# Patient Record
Sex: FEMALE | Race: WHITE | NOT HISPANIC OR LATINO | Employment: UNEMPLOYED | URBAN - METROPOLITAN AREA
[De-identification: names, ages, dates, MRNs, and addresses within clinical notes are randomized per-mention and may not be internally consistent; named-entity substitution may affect disease eponyms.]

---

## 2017-07-16 ENCOUNTER — HOSPITAL ENCOUNTER (EMERGENCY)
Facility: HOSPITAL | Age: 52
Discharge: HOME | End: 2017-07-16
Attending: EMERGENCY MEDICINE
Payer: COMMERCIAL

## 2017-07-16 VITALS
WEIGHT: 192.9 LBS | TEMPERATURE: 97.9 F | HEART RATE: 93 BPM | SYSTOLIC BLOOD PRESSURE: 146 MMHG | OXYGEN SATURATION: 98 % | DIASTOLIC BLOOD PRESSURE: 93 MMHG | RESPIRATION RATE: 16 BRPM

## 2017-07-16 DIAGNOSIS — Z23 NEED FOR RABIES VACCINATION: Primary | ICD-10-CM

## 2017-07-16 PROCEDURE — 90471 IMMUNIZATION ADMIN: CPT

## 2017-07-16 PROCEDURE — 96372 THER/PROPH/DIAG INJ SC/IM: CPT

## 2017-07-16 PROCEDURE — 99283 EMERGENCY DEPT VISIT LOW MDM: CPT

## 2017-07-16 PROCEDURE — 90376 RABIES IG HEAT TREATED: CPT | Performed by: EMERGENCY MEDICINE

## 2017-07-16 PROCEDURE — 90675 RABIES VACCINE IM: CPT | Performed by: EMERGENCY MEDICINE

## 2017-07-16 RX ORDER — METHOTREXATE 2.5 MG/1
TABLET ORAL WEEKLY
COMMUNITY
End: 2019-05-21 | Stop reason: ALTCHOICE

## 2017-07-16 RX ADMIN — HUMAN RABIES VIRUS IMMUNE GLOBULIN 1755 UNITS: 150 INJECTION, SOLUTION INTRAMUSCULAR at 11:44

## 2017-07-16 RX ADMIN — RABIES VACCINE 1 ML: KIT at 11:45

## 2017-07-19 ENCOUNTER — OFFICE VISIT (OUTPATIENT)
Dept: URGENT CARE | Facility: CLINIC | Age: 52
End: 2017-07-19
Payer: COMMERCIAL

## 2017-07-19 PROCEDURE — 99283 EMERGENCY DEPT VISIT LOW MDM: CPT

## 2017-07-19 PROCEDURE — 90675 RABIES VACCINE IM: CPT

## 2017-07-19 PROCEDURE — S9083 URGENT CARE CENTER GLOBAL: HCPCS

## 2017-07-19 PROCEDURE — G0382 LEV 3 HOSP TYPE B ED VISIT: HCPCS

## 2017-07-22 ENCOUNTER — OFFICE VISIT (OUTPATIENT)
Dept: URGENT CARE | Facility: CLINIC | Age: 52
End: 2017-07-22
Payer: COMMERCIAL

## 2017-07-22 PROCEDURE — 99282 EMERGENCY DEPT VISIT SF MDM: CPT

## 2017-07-22 PROCEDURE — G0381 LEV 2 HOSP TYPE B ED VISIT: HCPCS

## 2017-07-22 PROCEDURE — S9083 URGENT CARE CENTER GLOBAL: HCPCS

## 2017-07-28 ENCOUNTER — OFFICE VISIT (OUTPATIENT)
Dept: URGENT CARE | Facility: CLINIC | Age: 52
End: 2017-07-28
Payer: COMMERCIAL

## 2017-07-28 PROCEDURE — S9083 URGENT CARE CENTER GLOBAL: HCPCS

## 2017-07-28 PROCEDURE — 99283 EMERGENCY DEPT VISIT LOW MDM: CPT

## 2017-07-28 PROCEDURE — 90675 RABIES VACCINE IM: CPT

## 2017-07-28 PROCEDURE — G0382 LEV 3 HOSP TYPE B ED VISIT: HCPCS

## 2019-05-18 RX ORDER — ALBUTEROL SULFATE 90 UG/1
AEROSOL, METERED RESPIRATORY (INHALATION)
COMMUNITY
End: 2020-02-18 | Stop reason: SDUPTHER

## 2019-05-21 ENCOUNTER — OFFICE VISIT (OUTPATIENT)
Dept: FAMILY MEDICINE CLINIC | Facility: CLINIC | Age: 54
End: 2019-05-21
Payer: COMMERCIAL

## 2019-05-21 VITALS
TEMPERATURE: 98.1 F | WEIGHT: 193 LBS | SYSTOLIC BLOOD PRESSURE: 120 MMHG | HEIGHT: 66 IN | HEART RATE: 87 BPM | DIASTOLIC BLOOD PRESSURE: 72 MMHG | OXYGEN SATURATION: 98 % | BODY MASS INDEX: 31.02 KG/M2

## 2019-05-21 DIAGNOSIS — E55.9 VITAMIN D DEFICIENCY: ICD-10-CM

## 2019-05-21 DIAGNOSIS — M75.52 BURSITIS OF LEFT SHOULDER: ICD-10-CM

## 2019-05-21 DIAGNOSIS — M06.9 RHEUMATOID ARTHRITIS, INVOLVING UNSPECIFIED SITE, UNSPECIFIED RHEUMATOID FACTOR PRESENCE: Primary | ICD-10-CM

## 2019-05-21 DIAGNOSIS — R53.83 FATIGUE, UNSPECIFIED TYPE: ICD-10-CM

## 2019-05-21 PROCEDURE — 99203 OFFICE O/P NEW LOW 30 MIN: CPT | Performed by: FAMILY MEDICINE

## 2019-05-23 ENCOUNTER — TELEPHONE (OUTPATIENT)
Dept: FAMILY MEDICINE CLINIC | Facility: CLINIC | Age: 54
End: 2019-05-23

## 2019-05-23 LAB
25(OH)D3+25(OH)D2 SERPL-MCNC: 36.7 NG/ML (ref 30–100)
ALBUMIN SERPL-MCNC: 4.5 G/DL (ref 3.5–5.5)
ALBUMIN/GLOB SERPL: 1.7 {RATIO} (ref 1.2–2.2)
ALP SERPL-CCNC: 94 IU/L (ref 39–117)
ALT SERPL-CCNC: 15 IU/L (ref 0–32)
AST SERPL-CCNC: 15 IU/L (ref 0–40)
BILIRUB SERPL-MCNC: 0.2 MG/DL (ref 0–1.2)
BUN SERPL-MCNC: 18 MG/DL (ref 6–24)
BUN/CREAT SERPL: 24 (ref 9–23)
CALCIUM SERPL-MCNC: 9.8 MG/DL (ref 8.7–10.2)
CHLORIDE SERPL-SCNC: 106 MMOL/L (ref 96–106)
CHOLEST SERPL-MCNC: 212 MG/DL (ref 100–199)
CO2 SERPL-SCNC: 22 MMOL/L (ref 20–29)
CREAT SERPL-MCNC: 0.74 MG/DL (ref 0.57–1)
ERYTHROCYTE [SEDIMENTATION RATE] IN BLOOD BY WESTERGREN METHOD: 9 MM/HR (ref 0–40)
GLOBULIN SER-MCNC: 2.7 G/DL (ref 1.5–4.5)
GLUCOSE SERPL-MCNC: 107 MG/DL (ref 65–99)
HDLC SERPL-MCNC: 55 MG/DL
LDLC SERPL CALC-MCNC: 142 MG/DL (ref 0–99)
LDLC/HDLC SERPL: 2.6 RATIO (ref 0–3.2)
POTASSIUM SERPL-SCNC: 4.1 MMOL/L (ref 3.5–5.2)
PROT SERPL-MCNC: 7.2 G/DL (ref 6–8.5)
SL AMB EGFR AFRICAN AMERICAN: 106 ML/MIN/1.73
SL AMB EGFR NON AFRICAN AMERICAN: 92 ML/MIN/1.73
SL AMB VLDL CHOLESTEROL CALC: 15 MG/DL (ref 5–40)
SODIUM SERPL-SCNC: 144 MMOL/L (ref 134–144)
TRIGL SERPL-MCNC: 76 MG/DL (ref 0–149)
TSH SERPL DL<=0.005 MIU/L-ACNC: 0.6 UIU/ML (ref 0.45–4.5)

## 2019-06-24 ENCOUNTER — OFFICE VISIT (OUTPATIENT)
Dept: FAMILY MEDICINE CLINIC | Facility: CLINIC | Age: 54
End: 2019-06-24
Payer: COMMERCIAL

## 2019-06-24 VITALS
DIASTOLIC BLOOD PRESSURE: 72 MMHG | SYSTOLIC BLOOD PRESSURE: 124 MMHG | OXYGEN SATURATION: 98 % | TEMPERATURE: 98.4 F | HEIGHT: 66 IN | BODY MASS INDEX: 31.18 KG/M2 | WEIGHT: 194 LBS | HEART RATE: 82 BPM

## 2019-06-24 DIAGNOSIS — M75.52 BURSITIS OF LEFT SHOULDER: ICD-10-CM

## 2019-06-24 DIAGNOSIS — Z12.31 ENCOUNTER FOR SCREENING MAMMOGRAM FOR BREAST CANCER: Primary | ICD-10-CM

## 2019-06-24 DIAGNOSIS — Z00.00 WELLNESS EXAMINATION: ICD-10-CM

## 2019-06-24 DIAGNOSIS — M06.9 RHEUMATOID ARTHRITIS, INVOLVING UNSPECIFIED SITE, UNSPECIFIED RHEUMATOID FACTOR PRESENCE: ICD-10-CM

## 2019-06-24 PROCEDURE — 99396 PREV VISIT EST AGE 40-64: CPT | Performed by: FAMILY MEDICINE

## 2019-07-24 ENCOUNTER — EVALUATION (OUTPATIENT)
Dept: PHYSICAL THERAPY | Facility: CLINIC | Age: 54
End: 2019-07-24
Payer: COMMERCIAL

## 2019-07-24 DIAGNOSIS — M77.8 SHOULDER TENDONITIS, LEFT: Primary | ICD-10-CM

## 2019-07-24 DIAGNOSIS — M25.512 ACUTE PAIN OF LEFT SHOULDER: ICD-10-CM

## 2019-07-24 PROCEDURE — 97010 HOT OR COLD PACKS THERAPY: CPT | Performed by: PHYSICAL THERAPIST

## 2019-07-24 PROCEDURE — 97162 PT EVAL MOD COMPLEX 30 MIN: CPT | Performed by: PHYSICAL THERAPIST

## 2019-07-26 ENCOUNTER — APPOINTMENT (OUTPATIENT)
Dept: PHYSICAL THERAPY | Facility: CLINIC | Age: 54
End: 2019-07-26
Payer: COMMERCIAL

## 2019-07-30 ENCOUNTER — OFFICE VISIT (OUTPATIENT)
Dept: PHYSICAL THERAPY | Facility: CLINIC | Age: 54
End: 2019-07-30
Payer: COMMERCIAL

## 2019-07-30 DIAGNOSIS — M25.512 ACUTE PAIN OF LEFT SHOULDER: ICD-10-CM

## 2019-07-30 DIAGNOSIS — M77.8 SHOULDER TENDONITIS, LEFT: Primary | ICD-10-CM

## 2019-07-30 PROCEDURE — 97140 MANUAL THERAPY 1/> REGIONS: CPT | Performed by: PHYSICAL THERAPIST

## 2019-07-30 PROCEDURE — 97110 THERAPEUTIC EXERCISES: CPT | Performed by: PHYSICAL THERAPIST

## 2019-07-30 PROCEDURE — 97010 HOT OR COLD PACKS THERAPY: CPT | Performed by: PHYSICAL THERAPIST

## 2019-07-30 NOTE — PROGRESS NOTES
Daily Note     Today's date: 2019  Patient name: Aleah Quezada  : 1965  MRN: [id number]  Referring provider: Cindy Bansal MD  Dx:   Encounter Diagnosis     ICD-10-CM    1  Shoulder tendonitis, left M75 82    2  Acute pain of left shoulder M25 512                 Pt treated by MH, SPT under direct supervision of RS, YENY    Subjective: Pt reports she was sore after IE, but completing HEP exercises alleviated some pain and stiffness in her shoulder  She reports her neck is not as painful and that her HEP for her shoulder may be helping that as well  Objective: See treatment diary below      Assessment: Tolerated treatment fair  Pt performed exercises w/ good technique within tolerable range  Required few verbal/tactile cues to relax UT and decrease compensation  Pt completed exercises cautiously in attempt to decrease onset of pain  Pt continues to present with high reactivity to motion, but seemed to increase tolerance to manual PROM when distraction was applied through PROM  Patient would benefit from continued PT  Plan: Continue per plan of care  Focus on ROM and pain control  Precautions: Asthma (has inhaler), RA   EPOC: 2019     Daily Treatment Diary      Manual           L shoulder PROM NV MH,SPT         L GHJ mobs NV MH,SPT (distraction)         L Scapular PROM NV Np         L 1 rib mob  RS MH,SPT         TPR L UT/Lat delt/biceps NV MH,SPT                    Progress Note           Total time 3' 12'               Exercise Diary       HEP instruct and review  5'      Arm Delvin's: flexion/scap  (may not be able to tolerate initially)       Pendulums m/l and a/p   20x     Posterior shoulder rolls  20x     scap squeeze  30x     Table slides flex/scap  10"x10 ea     Supine AAROM flex/ER  5"x10 (flex only, ER inc pain*)     Standing Cane IR/ext  attempt nv     Std flex/scap              S/l IR/ER w/ towel roll       S/l abd AROM        scap punches Flexion centering       Prone mod row       Prone mod ext       Prone mod abd                                                              Modalities  7/24 7/30      CP 10' 10'       consider pulsed ultrasound

## 2019-08-01 ENCOUNTER — APPOINTMENT (OUTPATIENT)
Dept: PHYSICAL THERAPY | Facility: CLINIC | Age: 54
End: 2019-08-01
Payer: COMMERCIAL

## 2019-08-05 ENCOUNTER — APPOINTMENT (OUTPATIENT)
Dept: PHYSICAL THERAPY | Facility: CLINIC | Age: 54
End: 2019-08-05
Payer: COMMERCIAL

## 2019-08-07 ENCOUNTER — OFFICE VISIT (OUTPATIENT)
Dept: PHYSICAL THERAPY | Facility: CLINIC | Age: 54
End: 2019-08-07
Payer: COMMERCIAL

## 2019-08-07 DIAGNOSIS — M77.8 SHOULDER TENDONITIS, LEFT: Primary | ICD-10-CM

## 2019-08-07 DIAGNOSIS — M25.512 ACUTE PAIN OF LEFT SHOULDER: ICD-10-CM

## 2019-08-07 PROCEDURE — 97110 THERAPEUTIC EXERCISES: CPT

## 2019-08-07 NOTE — PROGRESS NOTES
Daily Note     Today's date: 2019  Patient name: Linda Gonzalez  : 1965  MRN: [id number]  Referring provider: Roderick Manrique MD  Dx:   Encounter Diagnosis     ICD-10-CM    1  Shoulder tendonitis, left M75 82    2  Acute pain of left shoulder M25 512                 Pt treated by MH, SPT under direct supervision of JK, PTA    Subjective: Pt reports sleeping has gotten easier since IE  Pt reports compliance w HEP and decrease in pain/symptoms  She states her shoulder feels more loose but she cont's to have limited ROM  Objective: See treatment diary below       Assessment: Tolerated treatment fair  Pt had inc tolerance for exercises w/ providing manuals first and pt demonstrated increased ROM/decreased pain  Pt cont's to compensate w/ UT to achieve shoulder ROM and requires tactile cues to decrease compensation  Patient would benefit from continued PT  Plan: Continue per plan of care  Focus on ROM and pain control  Precautions: Asthma (has inhaler), RA   EPOC: 2019     Daily Treatment Diary      Manual   8/7        L shoulder PROM NV MH,SPT MH,SPT        L GHJ mobs NV MH,SPT (distraction) MH,SPT (distraction/inferior)        L Scapular PROM NV Np np        L 1 rib mob  RS MH,SPT MH,SPT        TPR L UT/Lat delt/biceps NV MH,SPT MH,SPT                   Progress Note           Total time 3' 12' 12'              Exercise Diary   8/7    HEP instruct and review  5'      Arm Delvin's: flexion/scap  (may not be able to tolerate initially)       Pendulums m/l and a/p   20x 30x     Posterior shoulder rolls  20x 20x    scap squeeze  30x 30x     Table slides flex/scap  10"x10 ea 10" x 10 ea    Supine AAROM flex/ER  5"x10 (flex only, ER inc pain*) 5" x10  (flex)     Standing Cane IR/ext  attempt nv scaption 15x     Std flex/scap              S/l IR/ER w/ towel roll       S/l abd AROM        scap punches   15x    Flexion centering       Prone mod row       Prone mod ext Prone mod abd                                                              Modalities  7/24 7/30 8/7     CP 10' 10'  Home      consider pulsed ultrasound          Pt's treatment time over lapped another pt  Limited billing today due to pt was not one on one

## 2019-09-30 ENCOUNTER — TELEPHONE (OUTPATIENT)
Dept: FAMILY MEDICINE CLINIC | Facility: CLINIC | Age: 54
End: 2019-09-30

## 2019-10-11 ENCOUNTER — TELEPHONE (OUTPATIENT)
Dept: OTHER | Facility: OTHER | Age: 54
End: 2019-10-11

## 2019-10-11 ENCOUNTER — OFFICE VISIT (OUTPATIENT)
Dept: FAMILY MEDICINE CLINIC | Facility: CLINIC | Age: 54
End: 2019-10-11
Payer: COMMERCIAL

## 2019-10-11 VITALS
WEIGHT: 192 LBS | DIASTOLIC BLOOD PRESSURE: 74 MMHG | OXYGEN SATURATION: 98 % | BODY MASS INDEX: 32.78 KG/M2 | HEIGHT: 64 IN | SYSTOLIC BLOOD PRESSURE: 122 MMHG | HEART RATE: 87 BPM | TEMPERATURE: 97.2 F

## 2019-10-11 DIAGNOSIS — B36.9 FUNGAL RASH OF TORSO: ICD-10-CM

## 2019-10-11 DIAGNOSIS — J01.00 ACUTE MAXILLARY SINUSITIS, RECURRENCE NOT SPECIFIED: Primary | ICD-10-CM

## 2019-10-11 DIAGNOSIS — Z23 NEED FOR VACCINATION: ICD-10-CM

## 2019-10-11 PROCEDURE — 90471 IMMUNIZATION ADMIN: CPT

## 2019-10-11 PROCEDURE — 3008F BODY MASS INDEX DOCD: CPT | Performed by: NURSE PRACTITIONER

## 2019-10-11 PROCEDURE — 99213 OFFICE O/P EST LOW 20 MIN: CPT | Performed by: NURSE PRACTITIONER

## 2019-10-11 PROCEDURE — 90682 RIV4 VACC RECOMBINANT DNA IM: CPT

## 2019-10-11 RX ORDER — KETOCONAZOLE 20 MG/G
CREAM TOPICAL DAILY
Qty: 15 G | Refills: 0 | Status: SHIPPED | OUTPATIENT
Start: 2019-10-11

## 2019-10-11 RX ORDER — AMOXICILLIN AND CLAVULANATE POTASSIUM 875; 125 MG/1; MG/1
1 TABLET, FILM COATED ORAL EVERY 12 HOURS SCHEDULED
Qty: 20 TABLET | Refills: 0 | Status: SHIPPED | OUTPATIENT
Start: 2019-10-11 | End: 2019-10-12 | Stop reason: ALTCHOICE

## 2019-10-11 NOTE — PROGRESS NOTES
[facility]        NAME: Janey Wagner is a 53 y o  female  : 1965    MRN: [id number]  DATE: 2019  TIME: 9:00 AM    Assessment and Plan   Acute maxillary sinusitis, recurrence not specified [J01 00]  1  Acute maxillary sinusitis, recurrence not specified  amoxicillin-clavulanate (AUGMENTIN) 875-125 mg per tablet   2  Need for vaccination  influenza vaccine, [id number], quadrivalent, recombinant, PF, 0 5 mL, for patients 18 yr+ (FLUBLOK)   3  Fungal rash of torso  ketoconazole (NIZORAL) 2 % cream         Patient Instructions     Patient Instructions   Discussed viral vs bacterial infection  RX as ordered  Continue OTC cough/cold medications as directed  Flu vaccine given    Apply cream to rash under breast as directed  Keep area clean and dry  Call or return for problems/concerns            Chief Complaint     Chief Complaint   Patient presents with    Sinus Pressure     x1 week, using OTC meds    Rash     under breast         History of Present Illness       Sinus pain and pressure, congestion x 1 week  tried sudafed with no relief  Has allergies does not take any allergy medication daily  Review of Systems   Review of Systems   Constitutional: Positive for chills  Negative for activity change, fatigue and fever  HENT: Positive for congestion, ear pain, postnasal drip, rhinorrhea, sinus pressure, sinus pain and sore throat  Eyes: Negative for pain, discharge and redness  Respiratory: Positive for cough and shortness of breath  Negative for wheezing  Cardiovascular: Negative for chest pain  Gastrointestinal: Negative for constipation, diarrhea, nausea and vomiting  Musculoskeletal: Negative for myalgias  Skin: Negative for rash  Neurological: Positive for headaches  Negative for dizziness           Current Medications       Current Outpatient Medications:     albuterol (PROVENTIL HFA) 90 mcg/act inhaler, Inhale, Disp: , Rfl:    amoxicillin-clavulanate (AUGMENTIN) 875-125 mg per tablet, Take 1 tablet by mouth every 12 (twelve) hours for 10 days, Disp: 20 tablet, Rfl: 0    ketoconazole (NIZORAL) 2 % cream, Apply topically daily, Disp: 15 g, Rfl: 0    Current Allergies     Allergies as of 10/11/2019 - Reviewed 10/11/2019   Allergen Reaction Noted    Cinchonine  2017    Hydroxychloroquine  2016    Moxifloxacin  2017    Other  04/10/2015    Sulfa antibiotics  2017    Zithromax [azithromycin]  2017            The following portions of the patient's history were reviewed and updated as appropriate: allergies, current medications, past family history, past medical history, past social history, past surgical history and problem list      Past Medical History:   Diagnosis Date    Arthritis     Asthma     Rheumatoid arthritis ([address] )        Past Surgical History:   Procedure Laterality Date     SECTION         Family History   Adopted: Yes   Problem Relation Age of Onset    Alzheimer's disease Mother          Medications have been verified  Objective   /74   Pulse 87   Temp (!) 97 2 °F (36 2 °C)   Ht 5' 4" (1 626 m)   Wt 87 1 kg (192 lb)   SpO2 98%   BMI 32 96 kg/m²        Physical Exam     Physical Exam   Constitutional: She is oriented to person, place, and time  She appears well-developed and well-nourished  No distress  HENT:   Head: Normocephalic and atraumatic  Right Ear: Tympanic membrane, external ear and ear canal normal    Left Ear: Tympanic membrane, external ear and ear canal normal    Nose: Rhinorrhea present  No epistaxis  Right sinus exhibits maxillary sinus tenderness  Left sinus exhibits maxillary sinus tenderness  Mouth/Throat: Uvula is midline, oropharynx is clear and moist and mucous membranes are normal    Cardiovascular: Normal rate, regular rhythm and normal heart sounds  Exam reveals no gallop and no friction rub  No murmur heard    Pulmonary/Chest: Effort normal and breath sounds normal  No respiratory distress  She has no wheezes  She has no rales  Abdominal: She exhibits no distension  Musculoskeletal: Normal range of motion  Neurological: She is alert and oriented to person, place, and time  Skin: Skin is warm  Rash (under right breast) noted  She is not diaphoretic  Psychiatric: She has a normal mood and affect  Her behavior is normal  Judgment and thought content normal    Nursing note and vitals reviewed  BMI Counseling: Body mass index is 32 96 kg/m²  The BMI is above normal  Nutrition recommendations include reducing portion sizes, consuming healthier snacks, moderation in carbohydrate intake and increasing intake of lean protein  Exercise recommendations include exercising 3-5 times per week and strength training exercises

## 2019-10-11 NOTE — PATIENT INSTRUCTIONS
Discussed viral vs bacterial infection  RX as ordered  Continue OTC cough/cold medications as directed  Flu vaccine given    Apply cream to rash under breast as directed  Keep area clean and dry  Call or return for problems/concerns

## 2019-10-12 RX ORDER — AMOXICILLIN 500 MG/1
500 CAPSULE ORAL EVERY 8 HOURS SCHEDULED
Qty: 30 CAPSULE | Refills: 0 | Status: SHIPPED | OUTPATIENT
Start: 2019-10-12 | End: 2019-10-22

## 2019-10-12 NOTE — TELEPHONE ENCOUNTER
David Gilbert 1965  CONFIDENTIALTY NOTICE: This fax transmission is intended only for the addressee  It contains information that is legally privileged,  confidential or otherwise protected from use or disclosure  If you are not the intended recipient, you are strictly prohibited from reviewing,  disclosing, copying using or disseminating any of this information or taking any action in reliance on or regarding this information  If you have  received this fax in error, please notify us immediately by telephone so that we can arrange for its return to us  Page:   Call Id: [id number]  Health Call  Standard Call Report  Health Call  Patient Name: David Gilbert  Gender: Female  : 1965  Age: 53 Y 3 M 6 D  Return Phone  Number: [phone] (Current)  Address: [address] [place] [address]/[facility]: [facility] [address]  Practice Name: [address] [place] [address]  Practice Charged:  Physician:  [facility] Name:  Relationship To  Patient: Self  Return Phone Number: [phone] (Current)  Presenting Problem: " I was seen today at the office and the  wrong antibiotic was called in "  Service Type: Prescription Refills  Charged Service 1: N/A  Pharmacy Name and  Number:  Nurse Assessment  Protocols  Protocol Title Nurse Date/Time  Disp  Time Disposition Final User  10/11/2019 8:13:15 PM Send to Follow Up Briana Sims RN, [facility]  10/11/2019 8:39:49 PM Send to Follow Up Briana Sims RN, [facility]  10/11/2019 8:57:48 PM Close Yes Nicole Carballo RN, [facility]  Comments  User: Rosita Lombardo RN Date/Time: 10/11/2019 8:57:01 PM  Pt called because she is expecting Amoxicillin st the pharmacy, but Amoxicillin -clavulanate was prescribed  I spoke with  Katie Rocha ([address]) and she will call in the Amoxocillin tomorrow morning  Pt made aware and verbalized understanding

## 2020-02-18 ENCOUNTER — OFFICE VISIT (OUTPATIENT)
Dept: FAMILY MEDICINE CLINIC | Facility: CLINIC | Age: 55
End: 2020-02-18
Payer: COMMERCIAL

## 2020-02-18 VITALS
SYSTOLIC BLOOD PRESSURE: 120 MMHG | TEMPERATURE: 101.7 F | OXYGEN SATURATION: 96 % | RESPIRATION RATE: 20 BRPM | DIASTOLIC BLOOD PRESSURE: 80 MMHG | BODY MASS INDEX: 32.03 KG/M2 | WEIGHT: 187.6 LBS | HEIGHT: 64 IN | HEART RATE: 115 BPM

## 2020-02-18 DIAGNOSIS — J11.1 INFLUENZA: Primary | ICD-10-CM

## 2020-02-18 PROCEDURE — 3008F BODY MASS INDEX DOCD: CPT | Performed by: FAMILY MEDICINE

## 2020-02-18 PROCEDURE — 1036F TOBACCO NON-USER: CPT | Performed by: FAMILY MEDICINE

## 2020-02-18 PROCEDURE — 99213 OFFICE O/P EST LOW 20 MIN: CPT | Performed by: FAMILY MEDICINE

## 2020-02-18 RX ORDER — ALBUTEROL SULFATE 90 UG/1
2 AEROSOL, METERED RESPIRATORY (INHALATION) EVERY 4 HOURS PRN
Qty: 1 INHALER | Refills: 1 | Status: SHIPPED | OUTPATIENT
Start: 2020-02-18

## 2020-02-18 RX ORDER — OSELTAMIVIR PHOSPHATE 75 MG/1
75 CAPSULE ORAL EVERY 12 HOURS SCHEDULED
Qty: 10 CAPSULE | Refills: 0 | Status: SHIPPED | OUTPATIENT
Start: 2020-02-18 | End: 2020-02-23

## 2020-02-18 RX ORDER — BENZONATATE 200 MG/1
200 CAPSULE ORAL 3 TIMES DAILY PRN
Qty: 20 CAPSULE | Refills: 0 | Status: SHIPPED | OUTPATIENT
Start: 2020-02-18

## 2020-02-18 NOTE — PATIENT INSTRUCTIONS
This appears to be the flu  I would prescribed Tamiflu twice daily for 5 days at is early in the course  Over-the-counter medication for symptoms as discussed  Prescription for Tessalon Perles to help with the cough  Also a albuterol inhaler for any wheeziness  Influenza   AMBULATORY CARE:   Influenza  (the flu) is an infection caused by the influenza virus  The flu is easily spread when an infected person coughs, sneezes, or has close contact with others  You may be able to spread the flu to others for 1 week or longer after signs or symptoms appear  Common signs and symptoms include the following:   · Fever and chills    · Headaches, body aches, and muscle or joint pain    · Cough, runny nose, and sore throat    · Loss of appetite, nausea, vomiting, or diarrhea    · Tiredness    · Trouble breathing  Call 911 for any of the following:   · You have trouble breathing, and your lips look purple or blue  · You have a seizure  Seek care immediately if:   · You are dizzy, or you are urinating less or not at all  · You have a headache with a stiff neck, and you feel tired or confused  · You have new pain or pressure in your chest     · Your symptoms, such as shortness of breath, vomiting, or diarrhea, get worse  · Your symptoms, such as fever and coughing, seem to get better, but then get worse  Contact your healthcare provider if:   · You have new muscle pain or weakness  · You have questions or concerns about your condition or care  Treatment for influenza  may include any of the following:  · Acetaminophen  decreases pain and fever  It is available without a doctor's order  Ask how much to take and how often to take it  Follow directions  Acetaminophen can cause liver damage if not taken correctly  · NSAIDs , such as ibuprofen, help decrease swelling, pain, and fever  This medicine is available with or without a doctor's order   NSAIDs can cause stomach bleeding or kidney problems in certain people  If you take blood thinner medicine, always ask your healthcare provider if NSAIDs are safe for you  Always read the medicine label and follow directions  · Antivirals  help fight a viral infection  Manage your symptoms:   · Rest  as much as you can to help you recover  · Drink liquids as directed  to help prevent dehydration  Ask how much liquid to drink each day and which liquids are best for you  Prevent the spread of the flu:   · Wash your hands often  Use soap and water  Wash your hands after you use the bathroom, change a child's diapers, or sneeze  Wash your hands before you prepare or eat food  Use gel hand cleanser when soap and water are not available  Do not touch your eyes, nose, or mouth unless you have washed your hands first            · Cover your mouth when you sneeze or cough  Cough into a tissue or the bend of your arm  · Clean shared items with a germ-killing   Clean table surfaces, doorknobs, and light switches  Do not share towels, silverware, and dishes with people who are sick  Wash bed sheets, towels, silverware, and dishes with soap and water  · Wear a mask  over your mouth and nose if you are sick or are near anyone who is sick  · Stay away from others  if you are sick  · Influenza vaccine  helps prevent influenza (flu)  Everyone older than 6 months should get a yearly influenza vaccine  Get the vaccine as soon as it is available, usually in September or October each year  Follow up with your healthcare provider as directed:  Write down your questions so you remember to ask them during your visits  © 2017 [address] [employer] Information is for End User's use only and may not be sold, redistributed or otherwise used for commercial purposes  All illustrations and images included in CareNotes® are the copyrighted property of [employer]  or [employer]  The above information is an  only   It is not intended as medical advice for individual conditions or treatments  Talk to your doctor, nurse or pharmacist before following any medical regimen to see if it is safe and effective for you

## 2020-02-18 NOTE — PROGRESS NOTES
[address]         NAME: Whitney Laurent is a 53 y o  female  : 1965    MRN: [id number]  DATE: 2020  TIME: 1:07 PM    Assessment and Plan   Influenza [J11 1]  1  Influenza  albuterol (Proventil HFA) 90 mcg/act inhaler    oseltamivir (TAMIFLU) 75 mg capsule    benzonatate (TESSALON) 200 MG capsule       No problem-specific Assessment & Plan notes found for this encounter  Patient Instructions     Patient Instructions     This appears to be the flu  I would prescribed Tamiflu twice daily for 5 days at is early in the course  Over-the-counter medication for symptoms as discussed  Prescription for Tessalon Perles to help with the cough  Also a albuterol inhaler for any wheeziness  Influenza   AMBULATORY CARE:   Influenza  (the flu) is an infection caused by the influenza virus  The flu is easily spread when an infected person coughs, sneezes, or has close contact with others  You may be able to spread the flu to others for 1 week or longer after signs or symptoms appear  Common signs and symptoms include the following:   · Fever and chills    · Headaches, body aches, and muscle or joint pain    · Cough, runny nose, and sore throat    · Loss of appetite, nausea, vomiting, or diarrhea    · Tiredness    · Trouble breathing  Call 911 for any of the following:   · You have trouble breathing, and your lips look purple or blue  · You have a seizure  Seek care immediately if:   · You are dizzy, or you are urinating less or not at all  · You have a headache with a stiff neck, and you feel tired or confused  · You have new pain or pressure in your chest     · Your symptoms, such as shortness of breath, vomiting, or diarrhea, get worse  · Your symptoms, such as fever and coughing, seem to get better, but then get worse  Contact your healthcare provider if:   · You have new muscle pain or weakness      · You have questions or concerns about your condition or care  Treatment for influenza  may include any of the following:  · Acetaminophen  decreases pain and fever  It is available without a doctor's order  Ask how much to take and how often to take it  Follow directions  Acetaminophen can cause liver damage if not taken correctly  · NSAIDs , such as ibuprofen, help decrease swelling, pain, and fever  This medicine is available with or without a doctor's order  NSAIDs can cause stomach bleeding or kidney problems in certain people  If you take blood thinner medicine, always ask your healthcare provider if NSAIDs are safe for you  Always read the medicine label and follow directions  · Antivirals  help fight a viral infection  Manage your symptoms:   · Rest  as much as you can to help you recover  · Drink liquids as directed  to help prevent dehydration  Ask how much liquid to drink each day and which liquids are best for you  Prevent the spread of the flu:   · Wash your hands often  Use soap and water  Wash your hands after you use the bathroom, change a child's diapers, or sneeze  Wash your hands before you prepare or eat food  Use gel hand cleanser when soap and water are not available  Do not touch your eyes, nose, or mouth unless you have washed your hands first            · Cover your mouth when you sneeze or cough  Cough into a tissue or the bend of your arm  · Clean shared items with a germ-killing   Clean table surfaces, doorknobs, and light switches  Do not share towels, silverware, and dishes with people who are sick  Wash bed sheets, towels, silverware, and dishes with soap and water  · Wear a mask  over your mouth and nose if you are sick or are near anyone who is sick  · Stay away from others  if you are sick  · Influenza vaccine  helps prevent influenza (flu)  Everyone older than 6 months should get a yearly influenza vaccine   Get the vaccine as soon as it is available, usually in September or October each year   Follow up with your healthcare provider as directed:  Write down your questions so you remember to ask them during your visits  © 2017 [address] Information is for End User's use only and may not be sold, redistributed or otherwise used for commercial purposes  All illustrations and images included in CareNotes® are the copyrighted property of [employer] M [employer]  or Yossi Rebollar  The above information is an  only  It is not intended as medical advice for individual conditions or treatments  Talk to your doctor, nurse or pharmacist before following any medical regimen to see if it is safe and effective for you  Chief Complaint     Chief Complaint   Patient presents with    Cough     for 2 days    Nasal Congestion    Nausea         History of Present Illness       Patient ill over the last 48 hours  Fevers to 101 7 with chills  Cough with minimal production  Is having some headaches and some nausea  Temperature in the office today 101 7  Patient did have flu shot this year  Review of Systems   Review of Systems   Constitutional: Negative for appetite change, chills, diaphoresis and fever  HENT: Positive for congestion  Negative for ear pain, rhinorrhea, sinus pressure and sore throat  Eyes: Negative for discharge, redness and itching  Respiratory: Positive for cough and wheezing  Negative for shortness of breath  Cardiovascular: Negative for chest pain and palpitations  Rapid or slow heart rate   Gastrointestinal: Positive for nausea  Negative for abdominal pain, diarrhea and vomiting           Current Medications       Current Outpatient Medications:     albuterol (Proventil HFA) 90 mcg/act inhaler, Inhale 2 puffs every 4 (four) hours as needed for wheezing, Disp: 1 Inhaler, Rfl: 1    ketoconazole (NIZORAL) 2 % cream, Apply topically daily, Disp: 15 g, Rfl: 0    benzonatate (TESSALON) 200 MG capsule, Take 1 capsule (200 mg total) by mouth 3 (three) times a day as needed for cough, Disp: 20 capsule, Rfl: 0    oseltamivir (TAMIFLU) 75 mg capsule, Take 1 capsule (75 mg total) by mouth every 12 (twelve) hours for 5 days, Disp: 10 capsule, Rfl: 0    Current Allergies     Allergies as of 2020 - Reviewed 2020   Allergen Reaction Noted    Cinchonine  2017    Hydroxychloroquine  2016    Moxifloxacin  2017    Other  04/10/2015    Sulfa antibiotics  2017    Zithromax [azithromycin]  2017            The following portions of the patient's history were reviewed and updated as appropriate: allergies, current medications, past family history, past medical history, past social history, past surgical history and problem list      Past Medical History:   Diagnosis Date    Arthritis     Asthma     Rheumatoid [address] ([facility] [address] )        Past Surgical History:   Procedure Laterality Date     SECTION         Family History   Adopted: Yes   Problem Relation Age of Onset    Alzheimer's disease Mother          Medications have been verified  Objective   /80 (BP Location: Right arm, Patient Position: Sitting, Cuff Size: Large)   Pulse (!) 115   Temp (!) 101 7 °F (38 7 °C) (Tympanic)   Resp 20   Ht 5' 3 78" (1 62 m)   Wt 85 1 kg (187 lb 9 6 oz)   LMP  (LMP Unknown)   SpO2 96%   Breastfeeding No   BMI 32 42 kg/m²        Physical Exam     Physical Exam   Constitutional: She is oriented to person, place, and time  She appears well-developed and well-nourished  No distress  HENT:   Head: Normocephalic and atraumatic  Right Ear: Tympanic membrane and external ear normal  No drainage  Left Ear: Tympanic membrane normal  No drainage  Nose: Rhinorrhea present  Right sinus exhibits no maxillary sinus tenderness  Left sinus exhibits no maxillary sinus tenderness  Mouth/Throat: No oropharyngeal exudate  Eyes: Conjunctivae and EOM are normal  Right eye exhibits no discharge   Left eye exhibits no discharge  Neck: Normal range of motion  Neck supple  No thyromegaly present  Cardiovascular: Normal rate, regular rhythm and normal heart sounds  Pulmonary/Chest: Effort normal  No respiratory distress  She has no wheezes  She has no rales  Lymphadenopathy:     She has no cervical adenopathy  Neurological: She is alert and oriented to person, place, and time  Skin: Skin is warm and dry  Psychiatric: She has a normal mood and affect   Her behavior is normal

## 2020-09-08 ENCOUNTER — TELEPHONE (OUTPATIENT)
Dept: FAMILY MEDICINE CLINIC | Facility: CLINIC | Age: 55
End: 2020-09-08

## 2020-09-09 ENCOUNTER — OFFICE VISIT (OUTPATIENT)
Dept: FAMILY MEDICINE CLINIC | Facility: CLINIC | Age: 55
End: 2020-09-09
Payer: COMMERCIAL

## 2020-09-09 VITALS
HEART RATE: 71 BPM | BODY MASS INDEX: 31.99 KG/M2 | OXYGEN SATURATION: 99 % | HEIGHT: 65 IN | DIASTOLIC BLOOD PRESSURE: 90 MMHG | TEMPERATURE: 97.1 F | WEIGHT: 192 LBS | SYSTOLIC BLOOD PRESSURE: 138 MMHG

## 2020-09-09 DIAGNOSIS — Z12.31 SCREENING MAMMOGRAM, ENCOUNTER FOR: ICD-10-CM

## 2020-09-09 DIAGNOSIS — M06.9 RHEUMATOID ARTHRITIS, INVOLVING UNSPECIFIED SITE, UNSPECIFIED RHEUMATOID FACTOR PRESENCE: ICD-10-CM

## 2020-09-09 DIAGNOSIS — R73.01 IFG (IMPAIRED FASTING GLUCOSE): ICD-10-CM

## 2020-09-09 DIAGNOSIS — Z23 NEED FOR PROPHYLACTIC VACCINATION AGAINST CHOLERA WITH TYPHOID-PARATYPHOID (CHOLERA + TAB) VACCINE: ICD-10-CM

## 2020-09-09 DIAGNOSIS — Z23 NEED FOR VACCINATION: ICD-10-CM

## 2020-09-09 DIAGNOSIS — E78.5 BORDERLINE HYPERLIPIDEMIA: ICD-10-CM

## 2020-09-09 DIAGNOSIS — T14.8XXA PUNCTURE WOUND: Primary | ICD-10-CM

## 2020-09-09 DIAGNOSIS — E55.9 VITAMIN D DEFICIENCY: ICD-10-CM

## 2020-09-09 PROCEDURE — 90472 IMMUNIZATION ADMIN EACH ADD: CPT

## 2020-09-09 PROCEDURE — 3725F SCREEN DEPRESSION PERFORMED: CPT | Performed by: FAMILY MEDICINE

## 2020-09-09 PROCEDURE — 90471 IMMUNIZATION ADMIN: CPT

## 2020-09-09 PROCEDURE — 90715 TDAP VACCINE 7 YRS/> IM: CPT

## 2020-09-09 PROCEDURE — 90682 RIV4 VACC RECOMBINANT DNA IM: CPT

## 2020-09-09 PROCEDURE — 1036F TOBACCO NON-USER: CPT | Performed by: FAMILY MEDICINE

## 2020-09-09 PROCEDURE — 99214 OFFICE O/P EST MOD 30 MIN: CPT | Performed by: FAMILY MEDICINE

## 2020-09-09 RX ORDER — CEPHALEXIN 500 MG/1
500 CAPSULE ORAL EVERY 8 HOURS SCHEDULED
Qty: 15 CAPSULE | Refills: 0 | Status: SHIPPED | OUTPATIENT
Start: 2020-09-09 | End: 2020-09-14

## 2020-09-09 NOTE — PROGRESS NOTES
[id number]        NAME: Suraj Mia is a 63 y o  female  : 1965    MRN: [id number]  DATE: 2020  TIME: 10:52 AM    Assessment and Plan   Puncture wound [T14  8XXA]  1  Puncture wound  cephalexin (KEFLEX) 500 mg capsule   2  Vitamin D deficiency     3  Borderline hyperlipidemia     4  IFG (impaired fasting glucose)     5  Rheumatoid arthritis, involving unspecified site, unspecified rheumatoid factor presence ([facility] )     6  Screening mammogram, encounter for     7  BMI 31 0-31 9,adult         No problem-specific Assessment & Plan notes found for this encounter  Patient Instructions     Patient Instructions   Tetanus shot today  Will cover with Keflex 500 mg 3 times daily for 5 days  Get labs for annual physical as ordered  Get mammogram as ordered  Weight Management   AMBULATORY CARE:   Why it is important to manage your weight:  Being overweight increases your risk of health conditions such as heart disease, high blood pressure, type 2 diabetes, and certain types of cancer  It can also increase your risk for osteoarthritis, sleep apnea, and other respiratory problems  Aim for a slow, steady weight loss  Even a small amount of weight loss can lower your risk of health problems  How to lose weight safely:  A safe and healthy way to lose weight is to eat fewer calories and get regular exercise  You can lose up about 1 pound a week by decreasing the number of calories you eat by 500 calories each day  You can decrease calories by eating smaller portion sizes or by cutting out high-calorie foods  Read labels to find out how many calories are in the foods you eat  You can also burn calories with exercise such as walking, swimming, or biking  You will be more likely to keep weight off if you make these changes part of your lifestyle     Healthy meal plan for weight management:  A healthy meal plan includes a variety of foods, contains fewer calories, and helps you stay healthy  A healthy meal plan includes the following:  · Eat whole-grain foods more often  A healthy meal plan should contain fiber  Fiber is the part of grains, fruits, and vegetables that is not broken down by your body  Whole-grain foods are healthy and provide extra fiber in your diet  Some examples of whole-grain foods are whole-wheat breads and pastas, oatmeal, brown rice, and bulgur  · Eat a variety of vegetables every day  Include dark, leafy greens such as spinach, kale, liang greens, and mustard greens  Eat yellow and orange vegetables such as carrots, sweet potatoes, and winter squash  · Eat a variety of fruits every day  Choose fresh or canned fruit (canned in its own juice or light syrup) instead of juice  Fruit juice has very little or no fiber  · Eat low-fat dairy foods  Drink fat-free (skim) milk or 1% milk  Eat fat-free yogurt and low-fat cottage cheese  Try low-fat cheeses such as mozzarella and other reduced-fat cheeses  · Choose meat and other protein foods that are low in fat  Choose beans or other legumes such as split peas or lentils  Choose fish, skinless poultry (chicken or turkey), or lean cuts of red meat (beef or pork)  Before you cook meat or poultry, cut off any visible fat  · Use less fat and oil  Try baking foods instead of frying them  Add less fat, such as margarine, sour cream, regular salad dressing and mayonnaise to foods  Eat fewer high-fat foods  Some examples of high-fat foods include french fries, doughnuts, ice cream, and cakes  · Eat fewer sweets  Limit foods and drinks that are high in sugar  This includes candy, cookies, regular soda, and sweetened drinks  Ways to decrease calories:   · Eat smaller portions  ¨ Use a small plate with smaller servings  ¨ Do not eat second helpings  ¨ When you eat at a restaurant, ask for a box and place half of your meal in the box before you eat      ¨ Share an entrée with someone else     · Replace high-calorie snacks with healthy, low-calorie snacks  ¨ Choose fresh fruit, vegetables, fat-free rice cakes, or air-popped popcorn instead of potato chips, nuts, or chocolate  ¨ Choose water or calorie-free drinks instead of soda or sweetened drinks  · Eat regular meals  Skipping meals can lead to overeating later in the day  Eat a healthy snack in place of a meal if you do not have time to eat a regular meal      · Do not shop for groceries when you are hungry  You may be more likely to make unhealthy food choices  Take a grocery list of healthy foods and shop after you have eaten  Exercise:  Exercise at least 30 minutes per day on most days of the week  Some examples of exercise include walking, biking, dancing, and swimming  You can also fit in more physical activity by taking the stairs instead of the elevator or parking farther away from stores  Ask your healthcare provider about the best exercise plan for you  Other things to consider as you try to lose weight:   · Be aware of situations that may give you the urge to overeat, such as eating while watching television  Find ways to avoid these situations  For example, read a book, go for a walk, or do crafts  · Meet with a weight loss support group or friends who are also trying to lose weight  This may help you stay motivated to continue working on your weight loss goals  © 2017 [address] Information is for End User's use only and may not be sold, redistributed or otherwise used for commercial purposes  All illustrations and images included in CareNotes® are the copyrighted property of [employer]  or [employer]  The above information is an  only  It is not intended as medical advice for individual conditions or treatments  Talk to your doctor, nurse or pharmacist before following any medical regimen to see if it is safe and effective for you      Heart Healthy Diet AMBULATORY CARE:   A heart healthy diet  is an eating plan low in total fat, unhealthy fats, and sodium (salt)  A heart healthy diet helps decrease your risk for heart disease and stroke  Limit the amount of fat you eat to 25% to 35% of your total daily calories  Limit sodium to less than 2,300 mg each day  Healthy fats:  Healthy fats can help improve cholesterol levels  The risk for heart disease is decreased when cholesterol levels are normal  Choose healthy fats, such as the following:  · Unsaturated fat  is found in foods such as soybean, canola, olive, corn, and safflower oils  It is also found in soft tub margarine that is made with liquid vegetable oil  · Omega-3 fat  is found in certain fish, such as salmon, tuna, and trout, and in walnuts and flaxseed  Unhealthy fats:  Unhealthy fats can cause unhealthy cholesterol levels in your blood and increase your risk of heart disease  Limit unhealthy fats, such as the following:  · Cholesterol  is found in animal foods, such as eggs and lobster, and in dairy products made from whole milk  Limit cholesterol to less than 300 milligrams (mg) each day  You may need to limit cholesterol to 200 mg each day if you have heart disease  · Saturated fat  is found in meats, such as neal and hamburger  It is also found in chicken or turkey skin, whole milk, and butter  Limit saturated fat to less than 7% of your total daily calories  Limit saturated fat to less than 6% if you have heart disease or are at increased risk for it  · Trans fat  is found in packaged foods, such as potato chips and cookies  It is also in hard margarine, some fried foods, and shortening  Avoid trans fats as much as possible    Heart healthy foods and drinks to include:  Ask your dietitian or healthcare provider how many servings to have from each of the following food groups:  · Grains:      ¨ Whole-wheat breads, cereals, and pastas, and brown rice    ¨ Low-fat, low-sodium crackers and chips    · Vegetables:      ¨ Broccoli, green beans, green peas, and spinach    ¨ Collards, kale, and lima beans    ¨ Carrots, sweet potatoes, tomatoes, and peppers    ¨ Canned vegetables with no salt added    · Fruits:      ¨ Bananas, peaches, pears, and pineapple    ¨ Grapes, raisins, and dates    ¨ Oranges, tangerines, grapefruit, orange juice, and grapefruit juice    ¨ Apricots, mangoes, melons, and papaya    ¨ Raspberries and strawberries    ¨ Canned fruit with no added sugar    · Low-fat dairy products:      ¨ Nonfat (skim) milk, 1% milk, and low-fat almond, cashew, or soy milks fortified with calcium    ¨ Low-fat cheese, regular or frozen yogurt, and cottage cheese    · Meats and proteins , such as lean cuts of beef and pork (loin, leg, round), skinless chicken and turkey, legumes, soy products, egg whites, and nuts  Foods and drinks to limit or avoid:  Ask your dietitian or healthcare provider about these and other foods that are high in unhealthy fat, sodium, and sugar:  · Snack or packaged foods , such as frozen dinners, cookies, macaroni and cheese, and cereals with more than 300 mg of sodium per serving    · Canned or dry mixes  for cakes, soups, sauces, or gravies    · Vegetables with added sodium , such as instant potatoes, vegetables with added sauces, or regular canned vegetables    · Other foods high in sodium , such as ketchup, barbecue sauce, salad dressing, pickles, olives, soy sauce, and miso    · High-fat dairy foods  such as whole or 2% milk, cream cheese, or sour cream, and cheeses     · High-fat protein foods  such as high-fat cuts of beef (T-bone steaks, ribs), chicken or turkey with skin, and organ meats, such as liver    · Cured or smoked meats , such as hot dogs, neal, and sausage    · Unhealthy fats and oils , such as butter, stick margarine, shortening, and cooking oils such as coconut or palm oil    · Food and drinks high in sugar , such as soft drinks (soda), sports drinks, sweetened tea, candy, cake, cookies, pies, and doughnuts  Other diet guidelines to follow:   · Eat more foods containing omega-3 fats  Eat fish high in omega-3 fats at least 2 times a week  · Limit alcohol  Too much alcohol can damage your heart and raise your blood pressure  Women should limit alcohol to 1 drink a day  Men should limit alcohol to 2 drinks a day  A drink of alcohol is 12 ounces of beer, 5 ounces of wine, or 1½ ounces of liquor  · Choose low-sodium foods  High-sodium foods can lead to high blood pressure  Add little or no salt to food you prepare  Use herbs and spices in place of salt  · Eat more fiber  to help lower cholesterol levels  Eat at least 5 servings of fruits and vegetables each day  Eat 3 ounces of whole-grain foods each day  Legumes (beans) are also a good source of fiber  Lifestyle guidelines:   · Do not smoke  Nicotine and other chemicals in cigarettes and cigars can cause lung and heart damage  Ask your healthcare provider for information if you currently smoke and need help to quit  E-cigarettes or smokeless tobacco still contain nicotine  Talk to your healthcare provider before you use these products  · Exercise regularly  to help you maintain a healthy weight and improve your blood pressure and cholesterol levels  Ask your healthcare provider about the best exercise plan for you  Do not start an exercise program without asking your healthcare provider  Follow up with your healthcare provider as directed:  Write down your questions so you remember to ask them during your visits  © 2017 [address] Information is for End User's use only and may not be sold, redistributed or otherwise used for commercial purposes  All illustrations and images included in CareNotes® are the copyrighted property of [employer]  or [employer]oss[employer]  The above information is an  only   It is not intended as medical advice for individual conditions or treatments  Talk to your doctor, nurse or pharmacist before following any medical regimen to see if it is safe and effective for you  Chief Complaint     Chief Complaint   Patient presents with    Follow-up         History of Present Illness       Patient stepped on a nail 1 day ago  Puncture wound on the bottom of the right foot  Last tetanus was 2009  There is some tenderness small amount of drainage  Patient also would like flu shot while here  Patient also request annual labs to be sent in and she will reschedule physical   Due for mammogram     Vitamin-D deficiency-patient currently taking supplement  Borderline hyperlipidemia-no current meds  Due to get levels checked  Rheumatoid arthritis-patient currently on no medications  He is getting acupuncture  Has not seen rheumatologist recently  Review of Systems   Review of Systems   Constitutional: Negative for appetite change, chills, diaphoresis and fever  HENT: Negative for ear pain, rhinorrhea, sinus pressure and sore throat  Eyes: Negative for discharge, redness and itching  Respiratory: Negative for cough, shortness of breath and wheezing  Cardiovascular: Negative for chest pain and palpitations  Rapid or slow heart rate   Gastrointestinal: Negative for abdominal pain, diarrhea, nausea and vomiting           Current Medications       Current Outpatient Medications:     albuterol (Proventil HFA) 90 mcg/act inhaler, Inhale 2 puffs every 4 (four) hours as needed for wheezing, Disp: 1 Inhaler, Rfl: 1    benzonatate (TESSALON) 200 MG capsule, Take 1 capsule (200 mg total) by mouth 3 (three) times a day as needed for cough (Patient not taking: Reported on 9/9/2020), Disp: 20 capsule, Rfl: 0    cephalexin (KEFLEX) 500 mg capsule, Take 1 capsule (500 mg total) by mouth every 8 (eight) hours for 5 days, Disp: 15 capsule, Rfl: 0    ketoconazole (NIZORAL) 2 % cream, Apply topically daily (Patient not taking: Reported on 2020), Disp: 15 g, Rfl: 0    Current Allergies     Allergies as of 2020 - Reviewed 2020   Allergen Reaction Noted    Cinchonine  2017    Hydroxychloroquine  2016    Moxifloxacin  2017    Other  04/10/2015    Sulfa antibiotics  2017    Zithromax [azithromycin]  2017            The following portions of the patient's history were reviewed and updated as appropriate: allergies, current medications, past family history, past medical history, past social history, past surgical history and problem list      Past Medical History:   Diagnosis Date    Arthritis     Asthma     Rheumatoid arthritis ([address] )        Past Surgical History:   Procedure Laterality Date     SECTION         Family History   Adopted: Yes   Problem Relation Age of Onset    Alzheimer's disease Mother          Medications have been verified  Objective   /90 (BP Location: Left arm, Patient Position: Sitting, Cuff Size: Standard)   Pulse 71   Temp (!) 97 1 °F (36 2 °C) (Tympanic)   Ht 5' 5" (1 651 m)   Wt 87 1 kg (192 lb)   SpO2 99%   BMI 31 95 kg/m²        Physical Exam     Physical Exam  Vitals signs reviewed  Constitutional:       General: She is not in acute distress  Appearance: She is well-developed  HENT:      Head: Normocephalic and atraumatic  Right Ear: Tympanic membrane and external ear normal  No drainage  Left Ear: Tympanic membrane normal  No drainage  Mouth/Throat:      Pharynx: No oropharyngeal exudate  Eyes:      General:         Right eye: No discharge  Left eye: No discharge  Conjunctiva/sclera: Conjunctivae normal    Neck:      Musculoskeletal: Normal range of motion and neck supple  Thyroid: No thyromegaly  Cardiovascular:      Rate and Rhythm: Normal rate and regular rhythm  Heart sounds: Normal heart sounds  Pulmonary:      Effort: Pulmonary effort is normal  No respiratory distress  Breath sounds: No wheezing or rales  Lymphadenopathy:      Cervical: No cervical adenopathy  Skin:     General: Skin is warm and dry  Comments: Right foot-there is a puncture wound over the head of the 5th metatarsal   Mild tenderness  No erythema or induration noted                 BMI Counseling: Body mass index is 31 95 kg/m²  The BMI is above normal  Nutrition recommendations include reducing portion sizes, decreasing overall calorie intake and 3-5 servings of fruits/vegetables daily

## 2020-09-09 NOTE — PATIENT INSTRUCTIONS
Tetanus shot today  Will cover with Keflex 500 mg 3 times daily for 5 days  Get labs for annual physical as ordered  Get mammogram as ordered  Weight Management   AMBULATORY CARE:   Why it is important to manage your weight:  Being overweight increases your risk of health conditions such as heart disease, high blood pressure, type 2 diabetes, and certain types of cancer  It can also increase your risk for osteoarthritis, sleep apnea, and other respiratory problems  Aim for a slow, steady weight loss  Even a small amount of weight loss can lower your risk of health problems  How to lose weight safely:  A safe and healthy way to lose weight is to eat fewer calories and get regular exercise  You can lose up about 1 pound a week by decreasing the number of calories you eat by 500 calories each day  You can decrease calories by eating smaller portion sizes or by cutting out high-calorie foods  Read labels to find out how many calories are in the foods you eat  You can also burn calories with exercise such as walking, swimming, or biking  You will be more likely to keep weight off if you make these changes part of your lifestyle  Healthy meal plan for weight management:  A healthy meal plan includes a variety of foods, contains fewer calories, and helps you stay healthy  A healthy meal plan includes the following:  · Eat whole-grain foods more often  A healthy meal plan should contain fiber  Fiber is the part of grains, fruits, and vegetables that is not broken down by your body  Whole-grain foods are healthy and provide extra fiber in your diet  Some examples of whole-grain foods are whole-wheat breads and pastas, oatmeal, brown rice, and bulgur  · Eat a variety of vegetables every day  Include dark, leafy greens such as spinach, kale, liang greens, and mustard greens  Eat yellow and orange vegetables such as carrots, sweet potatoes, and winter squash  · Eat a variety of fruits every day    Choose fresh or canned fruit (canned in its own juice or light syrup) instead of juice  Fruit juice has very little or no fiber  · Eat low-fat dairy foods  Drink fat-free (skim) milk or 1% milk  Eat fat-free yogurt and low-fat cottage cheese  Try low-fat cheeses such as mozzarella and other reduced-fat cheeses  · Choose meat and other protein foods that are low in fat  Choose beans or other legumes such as split peas or lentils  Choose fish, skinless poultry (chicken or turkey), or lean cuts of red meat (beef or pork)  Before you cook meat or poultry, cut off any visible fat  · Use less fat and oil  Try baking foods instead of frying them  Add less fat, such as margarine, sour cream, regular salad dressing and mayonnaise to foods  Eat fewer high-fat foods  Some examples of high-fat foods include french fries, doughnuts, ice cream, and cakes  · Eat fewer sweets  Limit foods and drinks that are high in sugar  This includes candy, cookies, regular soda, and sweetened drinks  Ways to decrease calories:   · Eat smaller portions  ¨ Use a small plate with smaller servings  ¨ Do not eat second helpings  ¨ When you eat at a restaurant, ask for a box and place half of your meal in the box before you eat  ¨ Share an entrée with someone else  · Replace high-calorie snacks with healthy, low-calorie snacks  ¨ Choose fresh fruit, vegetables, fat-free rice cakes, or air-popped popcorn instead of potato chips, nuts, or chocolate  ¨ Choose water or calorie-free drinks instead of soda or sweetened drinks  · Eat regular meals  Skipping meals can lead to overeating later in the day  Eat a healthy snack in place of a meal if you do not have time to eat a regular meal      · Do not shop for groceries when you are hungry  You may be more likely to make unhealthy food choices  Take a grocery list of healthy foods and shop after you have eaten    Exercise:  Exercise at least 30 minutes per day on most days of the week  Some examples of exercise include walking, biking, dancing, and swimming  You can also fit in more physical activity by taking the stairs instead of the elevator or parking farther away from stores  Ask your healthcare provider about the best exercise plan for you  Other things to consider as you try to lose weight:   · Be aware of situations that may give you the urge to overeat, such as eating while watching television  Find ways to avoid these situations  For example, read a book, go for a walk, or do crafts  · Meet with a weight loss support group or friends who are also trying to lose weight  This may help you stay motivated to continue working on your weight loss goals  © 2017 [employer]  Information is for End User's use only and may not be sold, redistributed or otherwise used for commercial purposes  All illustrations and images included in CareNotes® are the copyrighted property of [employer]  or [employer]  The above information is an  only  It is not intended as medical advice for individual conditions or treatments  Talk to your doctor, nurse or pharmacist before following any medical regimen to see if it is safe and effective for you  Heart Healthy Diet   AMBULATORY CARE:   A heart healthy diet  is an eating plan low in total fat, unhealthy fats, and sodium (salt)  A heart healthy diet helps decrease your risk for heart disease and stroke  Limit the amount of fat you eat to 25% to 35% of your total daily calories  Limit sodium to less than 2,300 mg each day  Healthy fats:  Healthy fats can help improve cholesterol levels  The risk for heart disease is decreased when cholesterol levels are normal  Choose healthy fats, such as the following:  · Unsaturated fat  is found in foods such as soybean, canola, olive, corn, and safflower oils  It is also found in soft tub margarine that is made with liquid vegetable oil       · Omega-3 fat  is found in certain fish, such as salmon, tuna, and trout, and in walnuts and flaxseed  Unhealthy fats:  Unhealthy fats can cause unhealthy cholesterol levels in your blood and increase your risk of heart disease  Limit unhealthy fats, such as the following:  · Cholesterol  is found in animal foods, such as eggs and lobster, and in dairy products made from whole milk  Limit cholesterol to less than 300 milligrams (mg) each day  You may need to limit cholesterol to 200 mg each day if you have heart disease  · Saturated fat  is found in meats, such as neal and hamburger  It is also found in chicken or turkey skin, whole milk, and butter  Limit saturated fat to less than 7% of your total daily calories  Limit saturated fat to less than 6% if you have heart disease or are at increased risk for it  · Trans fat  is found in packaged foods, such as potato chips and cookies  It is also in hard margarine, some fried foods, and shortening  Avoid trans fats as much as possible    Heart healthy foods and drinks to include:  Ask your dietitian or healthcare provider how many servings to have from each of the following food groups:  · Grains:      ¨ Whole-wheat breads, cereals, and pastas, and brown rice    ¨ Low-fat, low-sodium crackers and chips    · Vegetables:      ¨ Broccoli, green beans, green peas, and spinach    ¨ Collards, kale, and lima beans    ¨ Carrots, sweet potatoes, tomatoes, and peppers    ¨ Canned vegetables with no salt added    · Fruits:      ¨ Bananas, peaches, pears, and pineapple    ¨ Grapes, raisins, and dates    ¨ Oranges, tangerines, grapefruit, orange juice, and grapefruit juice    ¨ Apricots, mangoes, melons, and papaya    ¨ Raspberries and strawberries    ¨ Canned fruit with no added sugar    · Low-fat dairy products:      ¨ Nonfat (skim) milk, 1% milk, and low-fat almond, cashew, or soy milks fortified with calcium    ¨ Low-fat cheese, regular or frozen yogurt, and cottage cheese    · Meats and proteins , such as lean cuts of beef and pork (loin, leg, round), skinless chicken and turkey, legumes, soy products, egg whites, and nuts  Foods and drinks to limit or avoid:  Ask your dietitian or healthcare provider about these and other foods that are high in unhealthy fat, sodium, and sugar:  · Snack or packaged foods , such as frozen dinners, cookies, macaroni and cheese, and cereals with more than 300 mg of sodium per serving    · Canned or dry mixes  for cakes, soups, sauces, or gravies    · Vegetables with added sodium , such as instant potatoes, vegetables with added sauces, or regular canned vegetables    · Other foods high in sodium , such as ketchup, barbecue sauce, salad dressing, pickles, olives, soy sauce, and miso    · High-fat dairy foods  such as whole or 2% milk, cream cheese, or sour cream, and cheeses     · High-fat protein foods  such as high-fat cuts of beef (T-bone steaks, ribs), chicken or turkey with skin, and organ meats, such as liver    · Cured or smoked meats , such as hot dogs, neal, and sausage    · Unhealthy fats and oils , such as butter, stick margarine, shortening, and cooking oils such as coconut or palm oil    · Food and drinks high in sugar , such as soft drinks (soda), sports drinks, sweetened tea, candy, cake, cookies, pies, and doughnuts  Other diet guidelines to follow:   · Eat more foods containing omega-3 fats  Eat fish high in omega-3 fats at least 2 times a week  · Limit alcohol  Too much alcohol can damage your heart and raise your blood pressure  Women should limit alcohol to 1 drink a day  Men should limit alcohol to 2 drinks a day  A drink of alcohol is 12 ounces of beer, 5 ounces of wine, or 1½ ounces of liquor  · Choose low-sodium foods  High-sodium foods can lead to high blood pressure  Add little or no salt to food you prepare  Use herbs and spices in place of salt  · Eat more fiber  to help lower cholesterol levels   Eat at least 5 servings of fruits and vegetables each day  Eat 3 ounces of whole-grain foods each day  Legumes (beans) are also a good source of fiber  Lifestyle guidelines:   · Do not smoke  Nicotine and other chemicals in cigarettes and cigars can cause lung and heart damage  Ask your healthcare provider for information if you currently smoke and need help to quit  E-cigarettes or smokeless tobacco still contain nicotine  Talk to your healthcare provider before you use these products  · Exercise regularly  to help you maintain a healthy weight and improve your blood pressure and cholesterol levels  Ask your healthcare provider about the best exercise plan for you  Do not start an exercise program without asking your healthcare provider  Follow up with your healthcare provider as directed:  Write down your questions so you remember to ask them during your visits  © 2017 [address] [facility] Information is for End User's use only and may not be sold, redistributed or otherwise used for commercial purposes  All illustrations and images included in CareNotes® are the copyrighted property of [employer]  or [employer]  The above information is an  only  It is not intended as medical advice for individual conditions or treatments  Talk to your doctor, nurse or pharmacist before following any medical regimen to see if it is safe and effective for you

## 2021-06-16 ENCOUNTER — APPOINTMENT (EMERGENCY)
Dept: RADIOLOGY | Facility: HOSPITAL | Age: 56
End: 2021-06-16
Payer: COMMERCIAL

## 2021-06-16 ENCOUNTER — APPOINTMENT (EMERGENCY)
Dept: CT IMAGING | Facility: HOSPITAL | Age: 56
End: 2021-06-16
Payer: COMMERCIAL

## 2021-06-16 ENCOUNTER — HOSPITAL ENCOUNTER (EMERGENCY)
Facility: HOSPITAL | Age: 56
Discharge: HOME | End: 2021-06-16
Attending: EMERGENCY MEDICINE | Admitting: EMERGENCY MEDICINE
Payer: COMMERCIAL

## 2021-06-16 VITALS
SYSTOLIC BLOOD PRESSURE: 158 MMHG | RESPIRATION RATE: 18 BRPM | OXYGEN SATURATION: 99 % | WEIGHT: 190 LBS | HEIGHT: 66 IN | BODY MASS INDEX: 30.53 KG/M2 | TEMPERATURE: 97.3 F | HEART RATE: 64 BPM | DIASTOLIC BLOOD PRESSURE: 76 MMHG

## 2021-06-16 DIAGNOSIS — K65.4 MESENTERIC PANNICULITIS (HCC): Primary | ICD-10-CM

## 2021-06-16 DIAGNOSIS — K21.9 GERD (GASTROESOPHAGEAL REFLUX DISEASE): ICD-10-CM

## 2021-06-16 DIAGNOSIS — R07.89 ATYPICAL CHEST PAIN: ICD-10-CM

## 2021-06-16 LAB
ALBUMIN SERPL BCP-MCNC: 4.1 G/DL (ref 3.5–5)
ALP SERPL-CCNC: 120 U/L (ref 46–116)
ALT SERPL W P-5'-P-CCNC: 41 U/L (ref 12–78)
ANION GAP SERPL CALCULATED.3IONS-SCNC: 11 MMOL/L (ref 4–13)
AST SERPL W P-5'-P-CCNC: 21 U/L (ref 5–45)
ATRIAL RATE: 65 BPM
BASOPHILS # BLD AUTO: 0.08 THOUSANDS/ΜL (ref 0–0.1)
BASOPHILS NFR BLD AUTO: 1 % (ref 0–1)
BILIRUB SERPL-MCNC: 0.4 MG/DL (ref 0.2–1)
BUN SERPL-MCNC: 12 MG/DL (ref 5–25)
CALCIUM SERPL-MCNC: 10.2 MG/DL (ref 8.3–10.1)
CHLORIDE SERPL-SCNC: 106 MMOL/L (ref 100–108)
CO2 SERPL-SCNC: 28 MMOL/L (ref 21–32)
CREAT SERPL-MCNC: 0.86 MG/DL (ref 0.6–1.3)
CRP SERPL QL: 5.7 MG/L
EOSINOPHIL # BLD AUTO: 0.31 THOUSAND/ΜL (ref 0–0.61)
EOSINOPHIL NFR BLD AUTO: 5 % (ref 0–6)
ERYTHROCYTE [DISTWIDTH] IN BLOOD BY AUTOMATED COUNT: 12.4 % (ref 11.6–15.1)
ERYTHROCYTE [SEDIMENTATION RATE] IN BLOOD: 37 MM/HOUR (ref 0–29)
GFR SERPL CREATININE-BSD FRML MDRD: 75 ML/MIN/1.73SQ M
GLUCOSE SERPL-MCNC: 103 MG/DL (ref 65–140)
HCT VFR BLD AUTO: 44.5 % (ref 34.8–46.1)
HGB BLD-MCNC: 14.4 G/DL (ref 11.5–15.4)
IMM GRANULOCYTES # BLD AUTO: 0.01 THOUSAND/UL (ref 0–0.2)
IMM GRANULOCYTES NFR BLD AUTO: 0 % (ref 0–2)
LIPASE SERPL-CCNC: 103 U/L (ref 73–393)
LYMPHOCYTES # BLD AUTO: 1.89 THOUSANDS/ΜL (ref 0.6–4.47)
LYMPHOCYTES NFR BLD AUTO: 32 % (ref 14–44)
MCH RBC QN AUTO: 28.9 PG (ref 26.8–34.3)
MCHC RBC AUTO-ENTMCNC: 32.4 G/DL (ref 31.4–37.4)
MCV RBC AUTO: 89 FL (ref 82–98)
MONOCYTES # BLD AUTO: 0.54 THOUSAND/ΜL (ref 0.17–1.22)
MONOCYTES NFR BLD AUTO: 9 % (ref 4–12)
NEUTROPHILS # BLD AUTO: 3.11 THOUSANDS/ΜL (ref 1.85–7.62)
NEUTS SEG NFR BLD AUTO: 53 % (ref 43–75)
NRBC BLD AUTO-RTO: 0 /100 WBCS
P AXIS: 1 DEGREES
PLATELET # BLD AUTO: 278 THOUSANDS/UL (ref 149–390)
PMV BLD AUTO: 9 FL (ref 8.9–12.7)
POTASSIUM SERPL-SCNC: 3.7 MMOL/L (ref 3.5–5.3)
PR INTERVAL: 142 MS
PROT SERPL-MCNC: 8.3 G/DL (ref 6.4–8.2)
QRS AXIS: 8 DEGREES
QRSD INTERVAL: 82 MS
QT INTERVAL: 450 MS
QTC INTERVAL: 468 MS
RBC # BLD AUTO: 4.99 MILLION/UL (ref 3.81–5.12)
SODIUM SERPL-SCNC: 145 MMOL/L (ref 136–145)
T WAVE AXIS: 42 DEGREES
TROPONIN I SERPL-MCNC: <0.02 NG/ML
VENTRICULAR RATE: 65 BPM
WBC # BLD AUTO: 5.94 THOUSAND/UL (ref 4.31–10.16)

## 2021-06-16 PROCEDURE — 96374 THER/PROPH/DIAG INJ IV PUSH: CPT

## 2021-06-16 PROCEDURE — 36415 COLL VENOUS BLD VENIPUNCTURE: CPT

## 2021-06-16 PROCEDURE — 93010 ELECTROCARDIOGRAM REPORT: CPT | Performed by: INTERNAL MEDICINE

## 2021-06-16 PROCEDURE — 99285 EMERGENCY DEPT VISIT HI MDM: CPT

## 2021-06-16 PROCEDURE — 85025 COMPLETE CBC W/AUTO DIFF WBC: CPT | Performed by: EMERGENCY MEDICINE

## 2021-06-16 PROCEDURE — 99285 EMERGENCY DEPT VISIT HI MDM: CPT | Performed by: EMERGENCY MEDICINE

## 2021-06-16 PROCEDURE — 96375 TX/PRO/DX INJ NEW DRUG ADDON: CPT

## 2021-06-16 PROCEDURE — 85652 RBC SED RATE AUTOMATED: CPT | Performed by: EMERGENCY MEDICINE

## 2021-06-16 PROCEDURE — 71275 CT ANGIOGRAPHY CHEST: CPT

## 2021-06-16 PROCEDURE — 71045 X-RAY EXAM CHEST 1 VIEW: CPT

## 2021-06-16 PROCEDURE — 74174 CTA ABD&PLVS W/CONTRAST: CPT

## 2021-06-16 PROCEDURE — 93005 ELECTROCARDIOGRAM TRACING: CPT

## 2021-06-16 PROCEDURE — 84484 ASSAY OF TROPONIN QUANT: CPT | Performed by: EMERGENCY MEDICINE

## 2021-06-16 PROCEDURE — C9113 INJ PANTOPRAZOLE SODIUM, VIA: HCPCS | Performed by: EMERGENCY MEDICINE

## 2021-06-16 PROCEDURE — 80053 COMPREHEN METABOLIC PANEL: CPT | Performed by: EMERGENCY MEDICINE

## 2021-06-16 PROCEDURE — 96361 HYDRATE IV INFUSION ADD-ON: CPT

## 2021-06-16 PROCEDURE — 86140 C-REACTIVE PROTEIN: CPT | Performed by: EMERGENCY MEDICINE

## 2021-06-16 PROCEDURE — 83690 ASSAY OF LIPASE: CPT | Performed by: EMERGENCY MEDICINE

## 2021-06-16 RX ORDER — PANTOPRAZOLE SODIUM 40 MG/1
40 INJECTION, POWDER, FOR SOLUTION INTRAVENOUS ONCE
Status: COMPLETED | OUTPATIENT
Start: 2021-06-16 | End: 2021-06-16

## 2021-06-16 RX ORDER — FAMOTIDINE 20 MG/1
20 TABLET, FILM COATED ORAL 2 TIMES DAILY
Qty: 60 TABLET | Refills: 0 | Status: SHIPPED | OUTPATIENT
Start: 2021-06-16 | End: 2021-07-16

## 2021-06-16 RX ORDER — PREDNISONE 10 MG/1
TABLET ORAL
Qty: 31 TABLET | Refills: 0 | Status: SHIPPED | OUTPATIENT
Start: 2021-06-16

## 2021-06-16 RX ORDER — METHYLPREDNISOLONE SODIUM SUCCINATE 125 MG/2ML
125 INJECTION, POWDER, LYOPHILIZED, FOR SOLUTION INTRAMUSCULAR; INTRAVENOUS ONCE
Status: COMPLETED | OUTPATIENT
Start: 2021-06-16 | End: 2021-06-16

## 2021-06-16 RX ORDER — SUCRALFATE ORAL 1 G/10ML
1 SUSPENSION ORAL
Qty: 420 ML | Refills: 0 | Status: SHIPPED | OUTPATIENT
Start: 2021-06-16

## 2021-06-16 RX ADMIN — METHYLPREDNISOLONE SODIUM SUCCINATE 125 MG: 125 INJECTION, POWDER, FOR SOLUTION INTRAMUSCULAR; INTRAVENOUS at 14:46

## 2021-06-16 RX ADMIN — SODIUM CHLORIDE 1000 ML: 0.9 INJECTION, SOLUTION INTRAVENOUS at 13:31

## 2021-06-16 RX ADMIN — PANTOPRAZOLE SODIUM 40 MG: 40 INJECTION, POWDER, FOR SOLUTION INTRAVENOUS at 12:17

## 2021-06-16 RX ADMIN — IOHEXOL 100 ML: 350 INJECTION, SOLUTION INTRAVENOUS at 13:09

## 2021-06-16 NOTE — ED PROVIDER NOTES
History  Chief Complaint   Patient presents with    Chest Pain     pt reports waking up with chest pain that she said feels like "heart burn"  reports that she also has pain in her mid back, which she has a history of  states when she took a blood pressure at home and it was "high" at 155/?      56y F here for evaluation of chest pain  Work this am w/ a discomfort/burning sensation in the substernal region  Pain has been constant 5/10 w/ some increased pain to 0/16 w/ certain movements or positions  Pain radiates straight back to the spine and is sharper there but still the 5/10  When she was getting dressed this am, noted that the pain radiated slightly into her left shoulder or that the left shoulder was also hurting  Denies any assoc lh, palpitations, sob/bolivar, diaphoresis  Denies recent illness, no f/c/s, no cough/congestion, normal appetite, no n/v/d, no changes in urination  Denies le pain or swelling  Tried tums / antacid w/o any improvement of the pain  Does drink a lot of coffee daily ("I would have already had 3 large cups by now")  Denies citrus, tomato, +occas nsaids  FH unknown - adopted  No personal hx of stress/cath, no hx of dvt/pe  Last saw her doctor about a year ago, but never did the labs "b/c of covid and all this craziness"  Doesn't take any medicine  Does have a home BP cuff and noted that her BP was elevated all morning (150s-180s/80s-100s)  History provided by:  Patient   used: No    Chest Pain  Pain location:  Substernal area  Pain quality: burning    Pain radiates to:  Mid back and L shoulder  Pain radiates to the back: yes    Pain severity:  Moderate  Onset quality:  Sudden  Duration: since she woke around 0800    Timing:  Constant  Progression:  Unchanged  Chronicity:  New  Context: no stress and no trauma    Relieved by:  Nothing  Worsened by:  Nothing tried  Ineffective treatments:  None tried  Associated symptoms: back pain, fatigue and heartburn    Associated symptoms: no abdominal pain, no anorexia, no cough, no diaphoresis, no fever, no lower extremity edema, no nausea, no near-syncope, no palpitations, no shortness of breath, not vomiting and no weakness        Prior to Admission Medications   Prescriptions Last Dose Informant Patient Reported? Taking? albuterol (Proventil HFA) 90 mcg/act inhaler   No No   Sig: Inhale 2 puffs every 4 (four) hours as needed for wheezing   benzonatate (TESSALON) 200 MG capsule   No No   Sig: Take 1 capsule (200 mg total) by mouth 3 (three) times a day as needed for cough   Patient not taking: Reported on 2020   ketoconazole (NIZORAL) 2 % cream   No No   Sig: Apply topically daily   Patient not taking: Reported on 2020      Facility-Administered Medications: None       Past Medical History:   Diagnosis Date    Arthritis     Asthma     Rheumatoid arthritis ([facility] [address] )        Past Surgical History:   Procedure Laterality Date     SECTION         Family History   Adopted: Yes   Problem Relation Age of Onset    Alzheimer's disease Mother      I have reviewed and agree with the history as documented  E-Cigarette/Vaping     E-Cigarette/Vaping Substances     Social History     Tobacco Use    Smoking status: Never Smoker    Smokeless tobacco: Never Used   Substance Use Topics    Alcohol use: Yes     Comment: socially    Drug use: No       Review of Systems   Constitutional: Positive for fatigue  Negative for diaphoresis and fever  Respiratory: Negative for cough and shortness of breath  Cardiovascular: Positive for chest pain  Negative for palpitations and near-syncope  Gastrointestinal: Positive for heartburn  Negative for abdominal pain, anorexia, nausea and vomiting  Musculoskeletal: Positive for back pain  Neurological: Negative for weakness  All other systems reviewed and are negative  Physical Exam  Physical Exam  Vitals and nursing note reviewed     Constitutional: General: She is not in acute distress  Appearance: Normal appearance  HENT:      Nose: Nose normal    Eyes:      Conjunctiva/sclera: Conjunctivae normal       Pupils: Pupils are equal, round, and reactive to light  Neck:      Vascular: No carotid bruit  Cardiovascular:      Rate and Rhythm: Normal rate and regular rhythm  Pulmonary:      Effort: Pulmonary effort is normal       Breath sounds: Normal breath sounds  Abdominal:      Palpations: Abdomen is soft  Tenderness: There is no abdominal tenderness  Musculoskeletal:         General: No swelling or tenderness  Cervical back: Normal range of motion  Skin:     Capillary Refill: Capillary refill takes less than 2 seconds  Neurological:      General: No focal deficit present  Mental Status: She is alert and oriented to person, place, and time     Psychiatric:         Mood and Affect: Mood normal          Vital Signs  ED Triage Vitals [06/16/21 1151]   Temperature Pulse Respirations Blood Pressure SpO2   (!) 97 3 °F (36 3 °C) 69 18 (!) 205/95 98 %      Temp Source Heart Rate Source Patient Position - Orthostatic VS BP Location FiO2 (%)   Temporal Monitor Lying Left arm --      Pain Score       --           Vitals:    06/16/21 1216 06/16/21 1400 06/16/21 1445 06/16/21 1500   BP: 166/80 137/63 164/81 158/76   Pulse: 64 56 67 64   Patient Position - Orthostatic VS: Lying Lying Lying Lying         Visual Acuity      ED Medications  Medications   pantoprazole (PROTONIX) injection 40 mg (40 mg Intravenous Given 6/16/21 1217)   sodium chloride 0 9 % bolus 1,000 mL (0 mL Intravenous Stopped 6/16/21 1505)   iohexol (OMNIPAQUE) 350 MG/ML injection (SINGLE-DOSE) 100 mL (100 mL Intravenous Given 6/16/21 1309)   methylPREDNISolone sodium succinate (Solu-MEDROL) injection 125 mg (125 mg Intravenous Given 6/16/21 1446)       Diagnostic Studies  Results Reviewed     Procedure Component Value Units Date/Time    Sedimentation rate, automated [[id number]]  (Abnormal) Collected: 06/16/21 1153    Lab Status: Final result Specimen: Blood from Arm, Right Updated: 06/16/21 1446     Sed Rate 37 mm/hour     C-reactive protein [[id number]]  (Abnormal) Collected: 06/16/21 1153    Lab Status: Final result Specimen: Blood from Arm, Right Updated: 06/16/21 1443     CRP 5 7 mg/L     Lipase [[id number]]  (Normal) Collected: 06/16/21 1153    Lab Status: Final result Specimen: Blood from Arm, Right Updated: 06/16/21 1300     Lipase 103 u/L     Comprehensive metabolic panel [[id number]]  (Abnormal) Collected: 06/16/21 1153    Lab Status: Final result Specimen: Blood from Arm, Right Updated: 06/16/21 1236     Sodium 145 mmol/L      Potassium 3 7 mmol/L      Chloride 106 mmol/L      CO2 28 mmol/L      ANION GAP 11 mmol/L      BUN 12 mg/dL      Creatinine 0 86 mg/dL      Glucose 103 mg/dL      Calcium 10 2 mg/dL      AST 21 U/L      ALT 41 U/L      Alkaline Phosphatase 120 U/L      Total Protein 8 3 g/dL      Albumin 4 1 g/dL      Total Bilirubin 0 40 mg/dL      eGFR 75 ml/min/1 73sq m     Narrative:      [place] guidelines for Chronic Kidney Disease (CKD):     Stage 1 with normal or high GFR (GFR > 90 mL/min/1 73 square meters)    Stage 2 Mild CKD (GFR = 60-89 mL/min/1 73 square meters)    Stage 3A Moderate CKD (GFR = 45-59 mL/min/1 73 square meters)    Stage 3B Moderate CKD (GFR = 30-44 mL/min/1 73 square meters)    Stage 4 Severe CKD (GFR = 15-29 mL/min/1 73 square meters)    Stage 5 End Stage CKD (GFR <15 mL/min/1 73 square meters)  Note: GFR calculation is accurate only with a steady state creatinine    Troponin I [[id number]]  (Normal) Collected: 06/16/21 1153    Lab Status: Final result Specimen: Blood from Arm, Right Updated: 06/16/21 1229     Troponin I <0 02 ng/mL     CBC and differential [[id number]] Collected: 06/16/21 1153    Lab Status: Final result Specimen: Blood from Arm, Right Updated: 06/16/21 1201     WBC 5 94 Thousand/uL RBC 4 99 Million/uL      Hemoglobin 14 4 g/dL      Hematocrit 44 5 %      MCV 89 fL      MCH 28 9 pg      MCHC 32 4 g/dL      RDW 12 4 %      MPV 9 0 fL      Platelets 896 Thousands/uL      nRBC 0 /100 WBCs      Neutrophils Relative 53 %      Immat GRANS % 0 %      Lymphocytes Relative 32 %      Monocytes Relative 9 %      Eosinophils Relative 5 %      Basophils Relative 1 %      Neutrophils Absolute 3 11 Thousands/µL      Immature Grans Absolute 0 01 Thousand/uL      Lymphocytes Absolute 1 89 Thousands/µL      Monocytes Absolute 0 54 Thousand/µL      Eosinophils Absolute 0 31 Thousand/µL      Basophils Absolute 0 08 Thousands/µL                  CTA dissection protocol chest abdomen pelvis w wo contrast   ED Interpretation by Zeyad London DO (06/16 1421)   noted      Final Result by Amos Maria MD (06/16 1415)      1  No findings of acute aortic injury  2   Hypodense 9 mm pancreatic body lesion suggesting a pancreatic cystic lesion  Recommend nonemergent follow-up abdomen MR/MRCP  3   Mild haziness in the central mesentery with associated mesenteric lymph nodes measuring less than 1 cm in short axis diameter, nonspecific but suggesting mesenteric panniculitis  4   Pulmonary nodules measuring up to 5 mm  Based on current Fleischner Society 2017 Guidelines on incidental pulmonary nodule, no routine follow-up is needed if the patient is considered low risk for lung cancer  If the patient is considered high risk    for lung cancer, 12 month follow-up non-contrast chest CT is recommended  The study was marked in [facility] for immediate notification  Workstation performed: [id number]         XR chest 1 view portable   Final Result by Diego Sosa MD (06/16 1423)      No acute cardiopulmonary disease                    Workstation performed: [id number]                    Procedures  ECG 12 Lead Documentation Only    Date/Time: 6/16/2021 11:59 AM  Performed by: Nathalie Solis DO Gabriela  Authorized by: Kathy Sena DO     ECG reviewed by me, the ED Provider: yes    Patient location:  ED  Previous ECG:     Previous ECG:  Compared to current    Similarity:  No change  Interpretation:     Interpretation: non-specific    Rate:     ECG rate:  65    ECG rate assessment: normal    Rhythm:     Rhythm: sinus rhythm    QRS:     QRS axis:  Normal  ST segments:     ST segments:  Normal  T waves:     T waves: normal               ED Course  ED Course as of Jun 16 1903   Wed Jun 16, 2021   1229 Initial blood pressures taken with a small cuff  Changed to larger cuff and initial pressures equal b/l UE -160s/80s      1306 Troponin I: <0 02   1423 CT results noted  Pt w/ hx of RA which is not currently being treated  Will start steroids and refer to Rheum for continued testing and monitoring  Will get Sed rate and CRP before discharge  HEART score 2 and came in after many hours of continuous pain so I don't feel that a second troponin necessary at this time                HEART Risk Score      Most Recent Value   Heart Score Risk Calculator   History  1 Filed at: 06/16/2021 1431   ECG  0 Filed at: 06/16/2021 1431   Age  1 Filed at: 06/16/2021 1431   Risk Factors  0 Filed at: 06/16/2021 1431   Troponin  0 Filed at: 06/16/2021 1431   HEART Score  2 Filed at: 06/16/2021 1431                      SBIRT 22yo+      Most Recent Value   SBIRT (23 yo +)   In order to provide better care to our patients, we are screening all of our patients for alcohol and drug use  Would it be okay to ask you these screening questions? Yes Filed at: 06/16/2021 1212   Initial Alcohol Screen: US AUDIT-C    1  How often do you have a drink containing alcohol?  0 Filed at: 06/16/2021 1212   2  How many drinks containing alcohol do you have on a typical day you are drinking? 0 Filed at: 06/16/2021 1212   3a  Male UNDER 65: How often do you have five or more drinks on one occasion? 0 Filed at: 06/16/2021 1212   3b  FEMALE Any Age, or MALE 65+: How often do you have 4 or more drinks on one occassion? 0 Filed at: 06/16/2021 1212   Audit-C Score  0 Filed at: 06/16/2021 1212   RACHEAL: How many times in the past year have you    Used an illegal drug or used a prescription medication for non-medical reasons? 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  Diagnosis management comments: 59y F here w/ cp radiating to back and shoulder  Initially recorded as very hypertensive, but cuff noted to be too small  Rechecked and more appropriate w/ spb in the 160s  Pt doesn't go to the doctor and is adopted no unknown w/ risk factors  Given elevated pressure and radiation into the back need to consider dissection as well as acs  No risk factors for PE, Wells 0  Will get labs, cta to r/o dissection, ekg, trop  Also will give some ppi/acid reducers for possible pud/gerd etiology       Amount and/or Complexity of Data Reviewed  Clinical lab tests: reviewed and ordered  Tests in the radiology section of CPT®: ordered and reviewed  Tests in the medicine section of CPT®: ordered and reviewed  Independent visualization of images, tracings, or specimens: yes    Risk of Complications, Morbidity, and/or Mortality  General comments: D/w pt cta findings  Given detailed follow up instructions/referrals   Will place on steroid taper    Patient Progress  Patient progress: improved      Disposition  Final diagnoses:   Mesenteric panniculitis (HCC)   GERD (gastroesophageal reflux disease)   Atypical chest pain     Time reflects when diagnosis was documented in both MDM as applicable and the Disposition within this note     Time User Action Codes Description Comment    6/16/2021  2:37 PM Phani CHÁVEZ Add [K65 4] Mesenteric panniculitis ([facility] [address] )     6/16/2021  2:37 PM Stephania Ochoa Add [R07 89] Atypical chest pain     6/16/2021  2:38 PM Stephania Ochoa Add [K21 9] GERD (gastroesophageal reflux disease)     6/16/2021  2:40 PM Stephania Ochoa Remove [R07 89] Atypical chest pain     6/16/2021  2:40 PM Stephania Ochoa Add [R07 89] Atypical chest pain       ED Disposition     ED Disposition Condition Date/Time Comment    Discharge Stable Wed Jun 16, 2021  2:37 PM Christine Bowles discharge to home/self care  Follow-up Information     Follow up With Specialties Details Why Contact Info Additional Information    Nolan King MD Family Medicine Schedule an appointment as soon as possible for a visit  as soon as possible to discuss your CT findings and to schedule follow up evaluation as noted below  [facility] [address]  [address] [facility] Schedule an appointment as soon as possible for a visit  as soon as possible to discuss your CT findings and to schedule follow up evaluation as noted below  [address] [facility] [address]  [facility] [address], [place], [address] [phone]    [facility] Schedule an appointment as soon as possible for a visit  as soon as possible to discuss your CT findings and to schedule follow up evaluation as noted below   [address] [facility], [address] [facility], [address], [place], [place], [address] [phone]          Discharge Medication List as of 6/16/2021  3:06 PM      START taking these medications    Details   famotidine (PEPCID) 20 mg tablet Take 1 tablet (20 mg total) by mouth 2 (two) times a day, Starting Wed 6/16/2021, Until Fri 7/16/2021, Normal      predniSONE 10 mg tablet 4tab po qd f3d, then 3tab po qd f3d, then 2tab po qd f3d, then 1tab po qd f3d, then 1/2tab po qd f2d, Normal      sucralfate (CARAFATE) 1 g/10 mL suspension Take 10 mL (1 g total) by mouth 4 (four) times a day (with meals and at bedtime), Starting Wed 6/16/2021, Normal         CONTINUE these medications which have NOT CHANGED    Details   albuterol (Proventil HFA) 90 mcg/act inhaler Inhale 2 puffs every 4 (four) hours as needed for wheezing, Starting Tue 2/18/2020, Normal      benzonatate (TESSALON) 200 MG capsule Take 1 capsule (200 mg total) by mouth 3 (three) times a day as needed for cough, Starting Tue 2/18/2020, Normal      ketoconazole (NIZORAL) 2 % cream Apply topically daily, Starting Fri 10/11/2019, Normal           No discharge procedures on file      PDMP Review     None          ED Provider  Electronically Signed by           Jojo Ren DO  06/16/21 8200

## 2021-06-16 NOTE — DISCHARGE INSTRUCTIONS
You had the following findings on your CT Scan:     1  Hypodense 9 mm pancreatic body lesion suggesting a pancreatic cystic lesion  Recommend nonemergent follow-up abdomen MR/MRCP - Follow up with your Primary Care Provider to have this study done  2   Mild haziness in the central mesentery with associated mesenteric lymph nodes measuring less than 1 cm in short axis diameter, nonspecific but suggesting mesenteric panniculitis  3  Pulmonary nodules measuring up to 5 mm  Based on current Fleischner Society 2017 Guidelines on incidental pulmonary nodule, no routine follow-up is needed if the patient is considered low risk for lung cancer  If the patient is considered high risk,  for lung cancer, 12 month follow-up non-contrast chest CT is recommended  What is mesenteric panniculitis? Mesenteric panniculitis is a rare disease that affects the part of the mesentery that contains fat cells  The mesentery is a continuous fold of tissue in your abdomen  You might not have heard of it before, but its important because it supports your intestines and attaches them to the abdominal wall of your body  The specific cause of mesenteric panniculitis isnt known, but may be related to autoimmune disease, abdominal surgery, injury to your abdomen, bacterial infection, or vascular problems  It causes chronic inflammation that damages and destroys fatty tissue in the mesentery  Over time, this can lead to scarring on the mesentery  You might hear your doctor call mesenteric panniculitis by a different name, such as sclerosing mesenteritis  Some medical professionals use the following to describe the stages of the condition:    Mesenteric lipodystrophy is the first stage  A type of immune system cell replaces fat tissue in the mesentery  Mesenteric panniculitis is the second stage  Additional types of immune system cells infiltrate the mesentery, and a lot of inflammation occurs during this stage    Retractile mesenteritis is the third stage  Its when the inflammation is accompanied by scar tissue formation in the mesentery  Mesenteric panniculitis typically isnt life-threatening  It may go away on its own, or it could develop into a severe disease  But while the inflammation is there, it can cause pain and other symptoms that interfere with your life  Your doctor can give you medicine to manage this inflammation and control symptoms  What are the symptoms? Symptoms vary from person to person  The clinical course may vary from no symptoms to severe and aggressive disease  If theres enough inflammation in the mesentery, the swelling can put pressure on organs near your intestines  This pressure can cause abdominal pain  Other common symptoms include:    nausea  vomiting  diarrhea  constipation  feeling full quickly after you eat  loss of appetite  weight loss  bloating  lump in your abdomen  fatigue  fever  Symptoms can last for a few weeks or months, and then go away  What causes this condition and whos at risk? Although the exact cause isnt known, doctors think mesenteric panniculitis is possibly a type of autoimmune disease  Normally, your immune system fights off bacteria, viruses, and other germs that can make you sick  In an autoimmune disease, your immune system mistakenly attacks your bodys own tissues  In this case, it attacks the mesentery  This attack produces the inflammation that causes symptoms  Autoimmune diseases have been linked to genes that run in families  People with mesenteric panniculitis often have a parent, sibling, or other relative with an autoimmune disease like rheumatoid arthritis or Crohns disease  This disease is rare overall, but its twice as common in men as in women      Inflammation may be triggered by damage to the abdomen, which can be caused by:    infection  surgery  some medicines  injuries  Cancer can also cause the mesentery to become inflamed and thickened  Mesenteric panniculitis may affect people with these cancers:    lymphoma  carcinoid tumors  colon cancer  kidney cancer  prostate cancer  melanoma  lung cancer  stomach cancer  Other conditions that are related to mesenteric panniculitis include:    orbital pseudotumor, which causes inflammation and swelling in the hollow space around and behind the eye  Riedel thyroiditis, which causes scar tissue to form in and around the thyroid gland  retroperitoneal fibrosis, which causes fibrous scar tissue to build up around organs located behind the membrane that lines and surrounds other organs in the front of your abdomen  sclerosing cholangitis, an inflammatory disease that causes scars to form in the bile ducts of your liver  How is it diagnosed? Mesenteric panniculitis is often misdiagnosed because its so rare  Sometimes doctors discover the disease incidentally when they do a CT scan to look for the cause of abdominal pain  This test can detect any signs of thickening or scarring in your mesentery  To make a diagnosis, your doctor may also have you undergo one or more blood tests to look for markers of inflammation in your body  This includes checking your erythrocyte sedimentation rate and C-reactive protein level  Your doctor may perform a biopsy to confirm the diagnosis  In this test, your doctor removes a sample of tissue from your mesentery and sends it to a lab to be examined  What treatment options are available? People with mesenteric panniculitis may not need treatment  Your doctor will monitor your symptoms and may do repeat CT scans to see if the inflammation is getting worse  Its possible for mesenteric panniculitis will go away on its own within a few weeks or months  If your symptoms bother you or they cause complications, your doctor will give you medicine to bring down inflammation in your body   Many of the drugs used to treat this condition work by suppressing the overactive immune system response  Corticosteroid drugs are often used to treat mesenteric panniculitis  Other medicines that treat this condition include:    azathioprine (Imuran)  colchicine (Colcrys)  cyclophosphamide  infliximab (Remicade)  naltrexone (Revia) at a low dose  pentoxifylline  thalidomide (Thalomid)    Are there possible complications? Inflammation in the mesentery can sometimes lead to a blockage in your small intestine  This blockage can cause symptoms like nausea, bloating, and pain, and it can prevent your intestines from absorbing nutrients from foods you eat, in addition to decreasing the normal forward movement of substances through your intestines  In these cases, surgery may be necessary to relieve your symptoms  Mesenteric panniculitis has also been linked to cancers, like lymphoma, prostate cancer, and kidney cancer  In a 2016 study, 28 percent[employer]usted Source of people with this condition either already had an associated cancer or were recently diagnosed with it  What can you expect? Mesenteric panniculitis is chronic, but it usually isnt serious or life-threatening  However, if your symptoms are severe, they can have a big effect on your quality of life  Symptoms can last anywhere from a couple of weeks to many years  The average length of time is about six monthsTrusted Source  Mesenteric panniculitis may even get better on its own

## 2021-06-18 ENCOUNTER — VBI (OUTPATIENT)
Dept: ADMINISTRATIVE | Facility: OTHER | Age: 56
End: 2021-06-18

## 2021-06-18 NOTE — TELEPHONE ENCOUNTER
[employer]    ED Visit Information     Ed visit date: 6/16/21  Diagnosis Description: Mesenteric Panniculitis  In Network? Yes [place]  Discharge status: Home  Discharged with meds ? Yes  Number of ED visits to date: 1  ED Severity:7     Outreach Information    Outreach successful: No 2  Date letter mailed:NA  Date Finalized:7/6/21    Care Coordination    Follow up appointment with pcp: no unable to reach patient  Transportation issues ?  No    [facility] [employer] type: 7 Day Outreach  [facility] PCP: Yes  Transportation: Self Transport  Practice Contacted Patient ?: No  Pt had ED follow up with pcp/staff ?: No    Seen for follow-up out of network ?: No  Reason Patient went to ED instead of Urgent Care or PCP?: Perceived Severity of Illness

## 2021-08-10 ENCOUNTER — OFFICE VISIT (OUTPATIENT)
Dept: URGENT CARE | Facility: CLINIC | Age: 56
End: 2021-08-10
Payer: COMMERCIAL

## 2021-08-10 ENCOUNTER — APPOINTMENT (OUTPATIENT)
Dept: RADIOLOGY | Facility: CLINIC | Age: 56
End: 2021-08-10
Payer: COMMERCIAL

## 2021-08-10 VITALS
HEIGHT: 66 IN | WEIGHT: 199.6 LBS | RESPIRATION RATE: 16 BRPM | TEMPERATURE: 97.9 F | DIASTOLIC BLOOD PRESSURE: 102 MMHG | OXYGEN SATURATION: 97 % | BODY MASS INDEX: 32.08 KG/M2 | SYSTOLIC BLOOD PRESSURE: 135 MMHG | HEART RATE: 72 BPM

## 2021-08-10 DIAGNOSIS — S90.122A CONTUSION OF FIFTH TOE, LEFT, INITIAL ENCOUNTER: ICD-10-CM

## 2021-08-10 DIAGNOSIS — S97.82XA CRUSHING INJURY OF LEFT FOOT, INITIAL ENCOUNTER: Primary | ICD-10-CM

## 2021-08-10 DIAGNOSIS — S99.921A RIGHT FOOT INJURY, INITIAL ENCOUNTER: ICD-10-CM

## 2021-08-10 PROCEDURE — 73630 X-RAY EXAM OF FOOT: CPT

## 2021-08-10 PROCEDURE — G0382 LEV 3 HOSP TYPE B ED VISIT: HCPCS | Performed by: FAMILY MEDICINE

## 2021-08-10 NOTE — PROGRESS NOTES
[facility]        NAME: Tamar Hopkins is a 61 y o  female  : 1965    MRN: [id number]  DATE: August 10, 2021  TIME: 1:40 PM    Assessment and Plan   Crushing injury of left foot, initial encounter [[id number]]  1  Crushing injury of left foot, initial encounter  XR foot 3+ vw left   2  Contusion of fifth toe, left, initial encounter            Patient Instructions       Follow up with PCP in 3-5 days  Proceed to  ER if symptoms worsen  Chief Complaint     Chief Complaint   Patient presents with    Toe Injury     Wed/thurs pt tripped over a package, hit pinky toe and lateral ball of foot  Pt states, "it just doesn't feel right" with occassional sharp pain, at rest duller pain  Pt feels like she is stepping ON something  1/10 aching pain at rest and while weightbearing and walking  History of Present Illness       13-year-old female presenting for evaluation of left toe pain  Patient reports stopping her left little toe on a box approximately 1 and half weeks ago  She does report some initial swelling present however this has since resolved  Pain now is a 1/10 and is reproduced with non specific attempted weight-bearing  Review of Systems   Review of Systems   Constitutional: Negative  HENT: Negative  Eyes: Negative  Respiratory: Negative  Cardiovascular: Negative  Gastrointestinal: Negative  Genitourinary: Negative  Musculoskeletal: Positive for arthralgias and myalgias  Skin: Negative  Allergic/Immunologic: Negative  Neurological: Negative  Hematological: Negative  Psychiatric/Behavioral: Negative            Current Medications       Current Outpatient Medications:     albuterol (Proventil HFA) 90 mcg/act inhaler, Inhale 2 puffs every 4 (four) hours as needed for wheezing, Disp: 1 Inhaler, Rfl: 1    benzonatate (TESSALON) 200 MG capsule, Take 1 capsule (200 mg total) by mouth 3 (three) times a day as needed for cough (Patient not taking: Reported on 2020), Disp: 20 capsule, Rfl: 0    famotidine (PEPCID) 20 mg tablet, Take 1 tablet (20 mg total) by mouth 2 (two) times a day, Disp: 60 tablet, Rfl: 0    ketoconazole (NIZORAL) 2 % cream, Apply topically daily (Patient not taking: Reported on 2020), Disp: 15 g, Rfl: 0    predniSONE 10 mg tablet, 4tab po qd f3d, then 3tab po qd f3d, then 2tab po qd f3d, then 1tab po qd f3d, then 1/2tab po qd f2d (Patient not taking: Reported on 8/10/2021), Disp: 31 tablet, Rfl: 0    sucralfate (CARAFATE) 1 g/10 mL suspension, Take 10 mL (1 g total) by mouth 4 (four) times a day (with meals and at bedtime) (Patient not taking: Reported on 8/10/2021), Disp: 420 mL, Rfl: 0    Current Allergies     Allergies as of 08/10/2021 - Reviewed 08/10/2021   Allergen Reaction Noted    Cinchonine  2017    Hydroxychloroquine  2016    Iv contrast [iodinated diagnostic agents] Throat Swelling 08/10/2021    Moxifloxacin  2017    Other  04/10/2015    Sulfa antibiotics  2017    Zithromax [azithromycin]  2017            The following portions of the patient's history were reviewed and updated as appropriate: allergies, current medications, past family history, past medical history, past social history, past surgical history and problem list      Past Medical History:   Diagnosis Date    Arthritis     Asthma     Rheumatoid arthritis ([address] )        Past Surgical History:   Procedure Laterality Date     SECTION         Family History   Adopted: Yes   Problem Relation Age of Onset    Alzheimer's disease Mother          Medications have been verified  Objective   BP (!) 135/102   Pulse 72   Temp 97 9 °F (36 6 °C)   Resp 16   Ht 5' 6" (1 676 m)   Wt 90 5 kg (199 lb 9 6 oz)   SpO2 97%   BMI 32 22 kg/m²   No LMP recorded  Patient is perimenopausal        Physical Exam     Physical Exam  Vitals and nursing note reviewed  Constitutional:       Appearance: She is well-developed  HENT:      Head: Normocephalic  Eyes:      Pupils: Pupils are equal, round, and reactive to light  Cardiovascular:      Rate and Rhythm: Normal rate and regular rhythm  Pulmonary:      Effort: Pulmonary effort is normal    Musculoskeletal:         General: Normal range of motion  Left foot: Normal range of motion  Bony tenderness present  No swelling  Legs:    Skin:     General: Skin is warm and dry  Neurological:      Mental Status: She is alert and oriented to person, place, and time

## 2021-08-27 ENCOUNTER — TELEPHONE (OUTPATIENT)
Dept: OTHER | Facility: OTHER | Age: 56
End: 2021-08-27

## 2021-08-27 NOTE — TELEPHONE ENCOUNTER
Patient called to cancel appointment schedule for today Friday Aug 27, 2021  9:15 AM  PHYSICAL [facility]  Amol Turner, MD [facility]  Due to not feeling well she received her 1st COVID vaccine  She will call back when ready to reschedule

## 2021-10-18 ENCOUNTER — OFFICE VISIT (OUTPATIENT)
Dept: URGENT CARE | Facility: CLINIC | Age: 56
End: 2021-10-18
Payer: COMMERCIAL

## 2021-10-18 VITALS
OXYGEN SATURATION: 97 % | RESPIRATION RATE: 20 BRPM | WEIGHT: 193 LBS | BODY MASS INDEX: 31.02 KG/M2 | HEIGHT: 66 IN | HEART RATE: 97 BPM | TEMPERATURE: 101.2 F

## 2021-10-18 DIAGNOSIS — J02.9 ACUTE VIRAL PHARYNGITIS: Primary | ICD-10-CM

## 2021-10-18 PROCEDURE — G0382 LEV 3 HOSP TYPE B ED VISIT: HCPCS | Performed by: PREVENTIVE MEDICINE

## 2021-10-23 ENCOUNTER — NURSE TRIAGE (OUTPATIENT)
Dept: OTHER | Facility: OTHER | Age: 56
End: 2021-10-23

## 2021-10-23 DIAGNOSIS — Z11.59 ENCOUNTER FOR SCREENING FOR OTHER VIRAL DISEASES: Primary | ICD-10-CM

## 2021-10-23 PROCEDURE — U0003 INFECTIOUS AGENT DETECTION BY NUCLEIC ACID (DNA OR RNA); SEVERE ACUTE RESPIRATORY SYNDROME CORONAVIRUS 2 (SARS-COV-2) (CORONAVIRUS DISEASE [COVID-19]), AMPLIFIED PROBE TECHNIQUE, MAKING USE OF HIGH THROUGHPUT TECHNOLOGIES AS DESCRIBED BY CMS-2020-01-R: HCPCS | Performed by: FAMILY MEDICINE

## 2021-10-23 PROCEDURE — U0005 INFEC AGEN DETEC AMPLI PROBE: HCPCS | Performed by: FAMILY MEDICINE

## 2021-10-25 ENCOUNTER — TELEPHONE (OUTPATIENT)
Dept: FAMILY MEDICINE CLINIC | Facility: CLINIC | Age: 56
End: 2021-10-25

## 2022-07-25 ENCOUNTER — TELEPHONE (OUTPATIENT)
Dept: FAMILY MEDICINE CLINIC | Facility: CLINIC | Age: 57
End: 2022-07-25

## 2022-07-25 DIAGNOSIS — R73.01 IFG (IMPAIRED FASTING GLUCOSE): ICD-10-CM

## 2022-07-25 DIAGNOSIS — E78.5 BORDERLINE HYPERLIPIDEMIA: ICD-10-CM

## 2022-07-25 DIAGNOSIS — E55.9 VITAMIN D DEFICIENCY: Primary | ICD-10-CM

## 2022-07-25 DIAGNOSIS — Z13.29 SCREENING FOR ENDOCRINE DISORDER: ICD-10-CM

## 2022-07-25 DIAGNOSIS — Z13.6 SCREENING FOR CARDIOVASCULAR CONDITION: ICD-10-CM

## 2022-07-25 NOTE — TELEPHONE ENCOUNTER
Patient would like blood work sent to [facility] for her annual physical  Patient was also inquiring about blood work that checks thyroids because she's experiencing symptoms like tiredness and not able to lose weight

## 2022-07-31 LAB
25(OH)D3 SERPL-MCNC: 46 NG/ML (ref 30–100)
ALBUMIN SERPL-MCNC: 4 G/DL (ref 3.6–5.1)
ALBUMIN/GLOB SERPL: 1.7 (CALC) (ref 1–2.5)
ALP SERPL-CCNC: 88 U/L (ref 37–153)
ALT SERPL-CCNC: 16 U/L (ref 6–29)
AST SERPL-CCNC: 16 U/L (ref 10–35)
BILIRUB SERPL-MCNC: 0.5 MG/DL (ref 0.2–1.2)
BUN SERPL-MCNC: 19 MG/DL (ref 7–25)
BUN/CREAT SERPL: ABNORMAL (CALC) (ref 6–22)
CALCIUM SERPL-MCNC: 8.9 MG/DL (ref 8.6–10.4)
CHLORIDE SERPL-SCNC: 108 MMOL/L (ref 98–110)
CHOLEST SERPL-MCNC: 213 MG/DL
CHOLEST/HDLC SERPL: 4.2 (CALC)
CO2 SERPL-SCNC: 25 MMOL/L (ref 20–32)
CREAT SERPL-MCNC: 0.72 MG/DL (ref 0.5–1.03)
EST. AVERAGE GLUCOSE BLD GHB EST-MCNC: 111 MG/DL
EST. AVERAGE GLUCOSE BLD GHB EST-SCNC: 6.2 MMOL/L
GFR/BSA.PRED SERPLBLD CYS-BASED-ARV: 97 ML/MIN/1.73M2
GLOBULIN SER CALC-MCNC: 2.4 G/DL (CALC) (ref 1.9–3.7)
GLUCOSE SERPL-MCNC: 101 MG/DL (ref 65–99)
HBA1C MFR BLD: 5.5 % OF TOTAL HGB
HDLC SERPL-MCNC: 51 MG/DL
LDLC SERPL CALC-MCNC: 143 MG/DL (CALC)
NONHDLC SERPL-MCNC: 162 MG/DL (CALC)
POTASSIUM SERPL-SCNC: 4.1 MMOL/L (ref 3.5–5.3)
PROT SERPL-MCNC: 6.4 G/DL (ref 6.1–8.1)
SODIUM SERPL-SCNC: 142 MMOL/L (ref 135–146)
TRIGL SERPL-MCNC: 83 MG/DL
TSH SERPL-ACNC: 2.07 MIU/L (ref 0.4–4.5)

## 2022-08-03 ENCOUNTER — TELEPHONE (OUTPATIENT)
Dept: FAMILY MEDICINE CLINIC | Facility: CLINIC | Age: 57
End: 2022-08-03

## 2022-08-15 ENCOUNTER — OFFICE VISIT (OUTPATIENT)
Dept: FAMILY MEDICINE CLINIC | Facility: CLINIC | Age: 57
End: 2022-08-15
Payer: COMMERCIAL

## 2022-08-15 VITALS
WEIGHT: 201 LBS | SYSTOLIC BLOOD PRESSURE: 142 MMHG | HEART RATE: 81 BPM | DIASTOLIC BLOOD PRESSURE: 82 MMHG | HEIGHT: 66 IN | OXYGEN SATURATION: 98 % | BODY MASS INDEX: 32.3 KG/M2

## 2022-08-15 DIAGNOSIS — Z12.31 ENCOUNTER FOR SCREENING MAMMOGRAM FOR BREAST CANCER: Primary | ICD-10-CM

## 2022-08-15 DIAGNOSIS — G47.30 SLEEP APNEA, UNSPECIFIED TYPE: ICD-10-CM

## 2022-08-15 DIAGNOSIS — Z00.00 WELLNESS EXAMINATION: ICD-10-CM

## 2022-08-15 DIAGNOSIS — M05.711 RHEUMATOID ARTHRITIS INVOLVING RIGHT SHOULDER WITH POSITIVE RHEUMATOID FACTOR (HCC): ICD-10-CM

## 2022-08-15 DIAGNOSIS — E53.8 B12 DEFICIENCY: ICD-10-CM

## 2022-08-15 PROCEDURE — 3725F SCREEN DEPRESSION PERFORMED: CPT | Performed by: FAMILY MEDICINE

## 2022-08-15 PROCEDURE — 99396 PREV VISIT EST AGE 40-64: CPT | Performed by: FAMILY MEDICINE

## 2022-08-15 PROCEDURE — 99214 OFFICE O/P EST MOD 30 MIN: CPT | Performed by: FAMILY MEDICINE

## 2022-08-15 NOTE — PROGRESS NOTES
HPI:  Emmanuel Brown is a 61 y o  female here for her yearly health maintenance exam    Patient Active Problem List   Diagnosis    Rheumat[address] [address]    Bursitis of left shoulder    Vitamin D deficiency    Contusion of fifth toe, left, initial encounter     Past Medical History:   Diagnosis Date    Arthritis     Asthma     Rheumatoid arthritis ([facility] [address] )        1  Advanced Directive: n     2  Durable Power of  for Healthcare: n     3  Social History:           Drug and alcohol History: n                  4  Immunizations up to date: y                 Lifestyle:                           Healthy Diet:n                          Alcohol Use:n                          Tobacco Use:n                          Regular exercise:y                          Weight concerns:y                               5  Over the past 2 weeks, how often have you been bothered by the following:              Little interest or pleasure in doing things:n              Felling down, depressed or hopeless:n       Current Outpatient Medications   Medication Sig Dispense Refill    albuterol (Proventil HFA) 90 mcg/act inhaler Inhale 2 puffs every 4 (four) hours as needed for wheezing 1 Inhaler 1    famotidine (PEPCID) 20 mg tablet Take 1 tablet (20 mg total) by mouth 2 (two) times a day 60 tablet 0     No current facility-administered medications for this visit       Allergies   Allergen Reactions    Cinchonine     Hydroxychloroquine     Iv Contrast [Iodinated Diagnostic Agents] Throat Swelling    Moxifloxacin     Other      Seasonal allergies    Sulfa Antibiotics     Zithromax [Azithromycin]      Immunization History   Administered Date(s) Administered    INFLUENZA 11/20/2014, 11/10/2015, 10/06/2016, 10/04/2017, 10/11/2018    Influenza Split 11/10/2015    Influenza, injectable, quadrivalent, preservative free 0 5 mL 10/11/2018    Influenza, recombinant, quadrivalent,injectable, preservative free 10/11/2019, 09/09/2020  Rabies 07/28/2017    Rabies, Intramuscular 07/16/2017    Tdap 09/09/2020       Patient Care Team:  Juan M Venegas MD as PCP - General (Family Medicine)    Review of Systems   Constitutional: Negative for fatigue, fever and unexpected weight change  HENT: Negative for congestion, sinus pain and sore throat  Eyes: Negative for visual disturbance  Respiratory: Negative for shortness of breath and wheezing  Cardiovascular: Negative for chest pain and palpitations  Gastrointestinal: Negative for abdominal pain, nausea and vomiting  Musculoskeletal: Negative  Negative for arthralgias and myalgias  Neurological: Negative for syncope, weakness and numbness  Psychiatric/Behavioral: Negative  Negative for confusion, dysphoric mood and suicidal ideas  Physical Exam :  Physical Exam  Constitutional:       Appearance: She is well-developed  HENT:      Right Ear: Ear canal normal  Tympanic membrane is not injected  Left Ear: Ear canal normal  Tympanic membrane is not injected  Nose: Nose normal    Eyes:      General:         Right eye: No discharge  Left eye: No discharge  Conjunctiva/sclera: Conjunctivae normal       Pupils: Pupils are equal, round, and reactive to light  Neck:      Thyroid: No thyromegaly  Cardiovascular:      Rate and Rhythm: Normal rate and regular rhythm  Heart sounds: Normal heart sounds  No murmur heard  Pulmonary:      Effort: Pulmonary effort is normal  No respiratory distress  Breath sounds: Normal breath sounds  No wheezing  Abdominal:      General: Bowel sounds are normal  There is no distension  Palpations: Abdomen is soft  Tenderness: There is no abdominal tenderness  Musculoskeletal:         General: Normal range of motion  Cervical back: Normal range of motion and neck supple  Lymphadenopathy:      Cervical: No cervical adenopathy  Skin:     General: Skin is warm and dry     Neurological:      Mental Status: She is alert and oriented to person, place, and time  She is not disoriented  Sensory: No sensory deficit  Gait: Gait normal       Deep Tendon Reflexes: Reflexes are normal and symmetric  Psychiatric:         Speech: Speech normal          Behavior: Behavior normal          Thought Content: Thought content normal          Judgment: Judgment normal            Assessment and Plan:  1  Encounter for screening mammogram for breast cancer  Mammo screening bilateral w 3d & cad   2  Wellness examination     3  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

## 2022-08-15 NOTE — PROGRESS NOTES
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 (HCC)    B12 deficiency  -     CBC and differential; Future  -     Vitamin B12; Future  -     C-reactive protein; Future  -     CBC and differential  -     Vitamin B12  -     C-reactive protein          Subjective:   Chief Complaint   Patient presents with    Physical Exam        Patient ID: Sammie Menon is a 61 y o  female  HPI    The following portions of the patient's history were reviewed and updated as appropriate: allergies, current medications, past family history, past medical history, past social history, past surgical history and problem list     Review of Systems   Constitutional: Negative for appetite change, chills, diaphoresis and fever  HENT: Negative for ear pain, rhinorrhea, sinus pressure and sore throat  Eyes: Negative for discharge, redness and itching  Respiratory: Negative for cough, shortness of breath and wheezing  Cardiovascular: Negative for chest pain and palpitations  Gastrointestinal: Negative for abdominal pain, diarrhea, nausea and vomiting  Objective:  Vitals:    08/15/22 1333   BP: 142/82   BP Location: Left arm   Patient Position: Sitting   Cuff Size: Adult   Pulse: 81   SpO2: 98%   Weight: 91 2 kg (201 lb)   Height: 5' 6" (1 676 m)      Physical Exam  Vitals and nursing note reviewed  Constitutional:       General: She is not in acute distress  Appearance: She is well-developed  She is not diaphoretic  HENT:      Right Ear: Tympanic membrane, ear canal and external ear normal  Tympanic membrane is not injected        Left Ear: Tympanic membrane, ear canal and external ear normal  Tympanic membrane is not injected  Nose: Nose normal    Eyes:      General:         Right eye: No discharge  Left eye: No discharge  Conjunctiva/sclera: Conjunctivae normal       Pupils: Pupils are equal, round, and reactive to light  Neck:      Thyroid: No thyromegaly  Cardiovascular:      Rate and Rhythm: Normal rate and regular rhythm  Heart sounds: Normal heart sounds  No murmur heard  Pulmonary:      Effort: Pulmonary effort is normal  No respiratory distress  Breath sounds: Normal breath sounds  No wheezing  Musculoskeletal:      Cervical back: Normal range of motion and neck supple  Lymphadenopathy:      Cervical: No cervical adenopathy

## 2022-08-17 ENCOUNTER — TELEPHONE (OUTPATIENT)
Dept: FAMILY MEDICINE CLINIC | Facility: CLINIC | Age: 57
End: 2022-08-17

## 2022-08-17 LAB
BASOPHILS # BLD AUTO: 72 CELLS/UL (ref 0–200)
BASOPHILS NFR BLD AUTO: 1.2 %
CRP SERPL-MCNC: 5.1 MG/L
EOSINOPHIL # BLD AUTO: 294 CELLS/UL (ref 15–500)
EOSINOPHIL NFR BLD AUTO: 4.9 %
ERYTHROCYTE [DISTWIDTH] IN BLOOD BY AUTOMATED COUNT: 12.3 % (ref 11–15)
HCT VFR BLD AUTO: 40.8 % (ref 35–45)
HGB BLD-MCNC: 13.4 G/DL (ref 11.7–15.5)
LYMPHOCYTES # BLD AUTO: 1914 CELLS/UL (ref 850–3900)
LYMPHOCYTES NFR BLD AUTO: 31.9 %
MCH RBC QN AUTO: 28.7 PG (ref 27–33)
MCHC RBC AUTO-ENTMCNC: 32.8 G/DL (ref 32–36)
MCV RBC AUTO: 87.4 FL (ref 80–100)
MONOCYTES # BLD AUTO: 420 CELLS/UL (ref 200–950)
MONOCYTES NFR BLD AUTO: 7 %
NEUTROPHILS # BLD AUTO: 3300 CELLS/UL (ref 1500–7800)
NEUTROPHILS NFR BLD AUTO: 55 %
PLATELET # BLD AUTO: 282 THOUSAND/UL (ref 140–400)
PMV BLD REES-ECKER: 9.4 FL (ref 7.5–12.5)
RBC # BLD AUTO: 4.67 MILLION/UL (ref 3.8–5.1)
VIT B12 SERPL-MCNC: 475 PG/ML (ref 200–1100)
WBC # BLD AUTO: 6 THOUSAND/UL (ref 3.8–10.8)

## 2022-08-30 ENCOUNTER — OFFICE VISIT (OUTPATIENT)
Dept: PULMONOLOGY | Facility: HOSPITAL | Age: 57
End: 2022-08-30
Payer: COMMERCIAL

## 2022-08-30 VITALS
BODY MASS INDEX: 32.14 KG/M2 | RESPIRATION RATE: 18 BRPM | SYSTOLIC BLOOD PRESSURE: 142 MMHG | DIASTOLIC BLOOD PRESSURE: 90 MMHG | WEIGHT: 200 LBS | OXYGEN SATURATION: 97 % | HEART RATE: 82 BPM | HEIGHT: 66 IN

## 2022-08-30 DIAGNOSIS — J45.20 MILD INTERMITTENT ASTHMA WITHOUT COMPLICATION: ICD-10-CM

## 2022-08-30 DIAGNOSIS — G47.19 EXCESSIVE DAYTIME SLEEPINESS: ICD-10-CM

## 2022-08-30 DIAGNOSIS — R06.83 SNORING: Primary | ICD-10-CM

## 2022-08-30 DIAGNOSIS — G47.00 INSOMNIA, UNSPECIFIED TYPE: ICD-10-CM

## 2022-08-30 PROCEDURE — 99204 OFFICE O/P NEW MOD 45 MIN: CPT | Performed by: INTERNAL MEDICINE

## 2022-08-30 NOTE — LETTER
August 30, 2022     Volodymyr Reyes MD  [facility]    Patient: Cisco Vang   YOB: 1965   Date of Visit: 8/30/2022       Dear Dr Mackenzie Wilson: Thank you for referring Nithya Tipton to me for evaluation  Below are my notes for this consultation  If you have questions, please do not hesitate to call me  I look forward to following your patient along with you  Sincerely,        Daryl Nixon DO        CC: No Recipients  Daryl Nixon DO  8/30/2022  3:06 PM  Sign when Signing Visit  Sleep Consultation   Cisco Vang 61 y o  female MRN: [id number]      Reason for consultation: sleep onset insomnia    Requesting physician: Dr Mackenzie Wilson    Assessment/Plan  61 y o  F with PMHx of Rheumatoid arthritis, hx of COVID who comes in for evaluation of snoring and excessive daytime sleepiness  1   Snoring/Excessive daytime sleepiness -  Mallampati class 2, Body mass index is 32 28 kg/m² , Neck Circumference: 16 5 (inches)  She is at risk for obstructive sleep apnea based on [employer] survey  -  Check a baseline polysomnogram to assess for obstructive sleep apnea      -  I discussed in depth the diagnostic studies and treatment options involved with obstructive sleep apnea      -  I also discussed in depth the risk of leaving sleep apnea untreated including hypertension, heart failure, arrhythmia, MI and stroke  -  The patient is agreeable to undergo testing and treatment of obstructive sleep apnea  She understands that pitfalls she may encounter along the way and is willing to attempt CPAP treatment  2   Sleep onset insomnia - this is likely due to apnea and anxiety  -  We discussed sleep hygiene, stimulus control and keeping a regular schedule       -  We discussed sleep restriction therapy to avoid laying in bed for extended periods    3  Asthma - generally well controlled    She has been using her albuterol a little more frequently since COVID but it is less than 1 time a week  History of Present Illness   HPI:  Katie Mcadams is a 61 y o  female with PMHx as below who comes in for evaluation of for snoring and daytime sleepiness  Patient notes weight gain and symptoms of difficulty falling asleep, difficulty staying asleep, snoring, excessive daytime sleepiness with an [place] score of 12, awakenings with gasping, morning headaches, awakenings with dry mouth, memory and concentration issues  she does note some mild symptoms of restless legs  she denies symptoms of cataplexy, sleep paralysis, hypnopompic or hypnagogic hallucinations  Sleep History:  she goes to bed anytime between 7 pm - 2am, will get out of bed at 6 am   she will get up 2-3 times at night for the bathroom or unknown reason  It will then take a few minutes to fall back asleep    she does nap on most days for 30 mins - 1 hr  She does not typically feel better after she naps  ROS:   Review of Systems   Constitutional: Positive for fatigue and unexpected weight change  Negative for appetite change  HENT: Positive for congestion and tinnitus  Eyes: Negative  Respiratory: Positive for apnea, shortness of breath and wheezing  Cardiovascular: Negative  Gastrointestinal: Negative for abdominal pain and constipation  Reflux   Endocrine: Positive for cold intolerance and heat intolerance  Genitourinary: Positive for frequency  Skin: Negative for color change, rash and wound  Neurological: Negative  Hematological: Negative  Psychiatric/Behavioral: Positive for sleep disturbance  The patient is nervous/anxious            Historical Information   Past Medical History:   Diagnosis Date    Arthritis     Asthma     Rheumat[address] [address] )      Past Surgical History:   Procedure Laterality Date     SECTION       Family History   Adopted: Yes   Problem Relation Age of Onset    Alzheimer's disease Mother      Social History     Socioeconomic History    Marital status: /Civil Union     Spouse name: Not on file    Number of children: Not on file    Years of education: Not on file    Highest education level: Not on file   Occupational History    Not on file   Tobacco Use    Smoking status: Never Smoker    Smokeless tobacco: Never Used   Substance and Sexual Activity    Alcohol use: Yes     Comment: socially    Drug use: No    Sexual activity: Yes     Partners: Male   Other Topics Concern    Not on file   Social History Narrative    Not on file     Social Determinants of Health     Financial Resource Strain: Not on file   Food Insecurity: Not on file   Transportation Needs: Not on file   Physical Activity: Not on file   Stress: Not on file   Social Connections: Not on file   Intimate Partner Violence: Not on file   Housing Stability: Not on file       Occupational History: Library, artist    Meds/Allergies   Allergies   Allergen Reactions    Cinchonine     Hydroxychloroquine     Iv Contrast [Iodinated Diagnostic Agents] Throat Swelling    Moxifloxacin     Other      Seasonal allergies    Sulfa Antibiotics     Zithromax [Azithromycin]        Home medications:  Prior to Admission medications    Medication Sig Start Date End Date Taking? Authorizing Provider   albuterol (Proventil HFA) 90 mcg/act inhaler Inhale 2 puffs every 4 (four) hours as needed for wheezing 2/18/20  Yes Isac Capps MD   famotidine (PEPCID) 20 mg tablet Take 1 tablet (20 mg total) by mouth 2 (two) times a day 6/16/21 8/15/22  Stephania Ochoa,        Vitals:   Blood pressure 142/90, pulse 82, resp  rate 18, height 5' 6" (1 676 m), weight 90 7 kg (200 lb), SpO2 97 %, not currently breastfeeding , RA, Body mass index is 32 28 kg/m²    Neck Circumference: 16 5 (inches)    Physical Exam  General: Pleasant, Awake alert and oriented x 3, conversant without conversational dyspnea, NAD, normal affect  HEENT:  PERRL, Sclera noninjected, nonicteric OU, Nares patent,  no craniofacial abnormalities, Mucous membranes, moist, no oral lesions, normal dentition, Mallampati class 4  NECK: Trachea midline, no accessory muscle use, no stridor, no cervical or supraclavicular adenopathy, JVP not elevated  CARDIAC: Reg, single s1/S2, no m/r/g  PULM: CTA bilaterally no wheezing, rhonchi or rales  ABD: Normoactive bowel sounds, soft nontender, nondistended, no rebound, no rigidity, no guarding  EXT: No cyanosis, no clubbing, no edema, normal capillary refill  NEURO: no focal neurologic deficits, AAOx3, moving all extremities appropriately    Labs: I have personally reviewed pertinent lab results    Lab Results   Component Value Date    WBC 6 0 08/16/2022    HGB 13 4 08/16/2022    HCT 40 8 08/16/2022    MCV 87 4 08/16/2022     08/16/2022      Lab Results   Component Value Date    CALCIUM 8 9 07/30/2022    K 4 1 07/30/2022    CO2 25 07/30/2022     07/30/2022    BUN 19 07/30/2022    CREATININE 0 72 07/30/2022     No results found for: IRON, TIBC, FERRITIN  Lab Results   Component Value Date    [id number] 08/16/2022     Sleep studies:  None                                       DO Jenna Sainz [address] Sleep Physician

## 2022-08-30 NOTE — PROGRESS NOTES
Sleep Consultation   Pipo Caldera 61 y o  female MRN: [id number]      Reason for consultation: sleep onset insomnia    Requesting physician: Dr Pat eDan    Assessment/Plan  61 y o  F with PMHx of Rheumatoid arthritis, hx of COVID who comes in for evaluation of snoring and excessive daytime sleepiness  1   Snoring/Excessive daytime sleepiness -  Mallampati class 2, Body mass index is 32 28 kg/m² , Neck Circumference: 16 5 (inches)  She is at risk for obstructive sleep apnea based on STOP BANG survey  -  Check a baseline polysomnogram to assess for obstructive sleep apnea      -  I discussed in depth the diagnostic studies and treatment options involved with obstructive sleep apnea      -  I also discussed in depth the risk of leaving sleep apnea untreated including hypertension, heart failure, arrhythmia, MI and stroke  -  The patient is agreeable to undergo testing and treatment of obstructive sleep apnea  She understands that pitfalls she may encounter along the way and is willing to attempt CPAP treatment  2   Sleep onset insomnia - this is likely due to apnea and anxiety  -  We discussed sleep hygiene, stimulus control and keeping a regular schedule       -  We discussed sleep restriction therapy to avoid laying in bed for extended periods    3  Asthma - generally well controlled  She has been using her albuterol a little more frequently since COVID but it is less than 1 time a week  History of Present Illness   HPI:  Pipo Caldera is a 61 y o  female with PMHx as below who comes in for evaluation of for snoring and daytime sleepiness  Patient notes weight gain and symptoms of difficulty falling asleep, difficulty staying asleep, snoring, excessive daytime sleepiness with an Freehold score of 12, awakenings with gasping, morning headaches, awakenings with dry mouth, memory and concentration issues  she does note some mild symptoms of restless legs    she denies symptoms of cataplexy, sleep paralysis, hypnopompic or hypnagogic hallucinations  Sleep History:  she goes to bed anytime between 7 pm - 2am, will get out of bed at 6 am   she will get up 2-3 times at night for the bathroom or unknown reason  It will then take a few minutes to fall back asleep    she does nap on most days for 30 mins - 1 hr  She does not typically feel better after she naps  ROS:   Review of Systems   Constitutional: Positive for fatigue and unexpected weight change  Negative for appetite change  HENT: Positive for congestion and tinnitus  Eyes: Negative  Respiratory: Positive for apnea, shortness of breath and wheezing  Cardiovascular: Negative  Gastrointestinal: Negative for abdominal pain and constipation  Reflux   Endocrine: Positive for cold intolerance and heat intolerance  Genitourinary: Positive for frequency  Skin: Negative for color change, rash and wound  Neurological: Negative  Hematological: Negative  Psychiatric/Behavioral: Positive for sleep disturbance  The patient is nervous/anxious            Historical Information   Past Medical History:   Diagnosis Date    Arthritis     Asthma     Rheumatoid arthritis ([facility] [address] )      Past Surgical History:   Procedure Laterality Date     SECTION       Family History   Adopted: Yes   Problem Relation Age of Onset    Alzheimer's disease Mother      Social History     Socioeconomic History    Marital status: /Civil Union     Spouse name: Not on file    Number of children: Not on file    Years of education: Not on file    Highest education level: Not on file   Occupational History    Not on file   Tobacco Use    Smoking status: Never Smoker    Smokeless tobacco: Never Used   Substance and Sexual Activity    Alcohol use: Yes     Comment: socially    Drug use: No    Sexual activity: Yes     Partners: Male   Other Topics Concern    Not on file   Social History Narrative    Not on file Social Determinants of Health     Financial Resource Strain: Not on file   Food Insecurity: Not on file   Transportation Needs: Not on file   Physical Activity: Not on file   Stress: Not on file   Social Connections: Not on file   Intimate Partner Violence: Not on file   Housing Stability: Not on file       Occupational History: Library, artist    Meds/Allergies   Allergies   Allergen Reactions    Cinchonine     Hydroxychloroquine     Iv Contrast [Iodinated Diagnostic Agents] Throat Swelling    Moxifloxacin     Other      Seasonal allergies    Sulfa Antibiotics     Zithromax [Azithromycin]        Home medications:  Prior to Admission medications    Medication Sig Start Date End Date Taking? Authorizing Provider   albuterol (Proventil HFA) 90 mcg/act inhaler Inhale 2 puffs every 4 (four) hours as needed for wheezing 2/18/20  Yes Mady Daniel MD   famotidine (PEPCID) 20 mg tablet Take 1 tablet (20 mg total) by mouth 2 (two) times a day 6/16/21 8/15/22  Stephania Ochoa, DO       Vitals:   Blood pressure 142/90, pulse 82, resp  rate 18, height 5' 6" (1 676 m), weight 90 7 kg (200 lb), SpO2 97 %, not currently breastfeeding , RA, Body mass index is 32 28 kg/m²    Neck Circumference: 16 5 (inches)    Physical Exam  General: Pleasant, Awake alert and oriented x 3, conversant without conversational dyspnea, NAD, normal affect  HEENT:  PERRL, Sclera noninjected, nonicteric OU, Nares patent,  no craniofacial abnormalities, Mucous membranes, moist, no oral lesions, normal dentition, Mallampati class 4  NECK: Trachea midline, no accessory muscle use, no stridor, no cervical or supraclavicular adenopathy, JVP not elevated  CARDIAC: Reg, single s1/S2, no m/r/g  PULM: CTA bilaterally no wheezing, rhonchi or rales  ABD: Normoactive bowel sounds, soft nontender, nondistended, no rebound, no rigidity, no guarding  EXT: No cyanosis, no clubbing, no edema, normal capillary refill  NEURO: no focal neurologic deficits, AAOx3, moving all extremities appropriately    Labs: I have personally reviewed pertinent lab results    Lab Results   Component Value Date    WBC 6 0 08/16/2022    HGB 13 4 08/16/2022    HCT 40 8 08/16/2022    MCV 87 4 08/16/2022     08/16/2022      Lab Results   Component Value Date    CALCIUM 8 9 07/30/2022    K 4 1 07/30/2022    CO2 25 07/30/2022     07/30/2022    BUN 19 07/30/2022    CREATININE 0 72 07/30/2022     No results found for: IRON, TIBC, FERRITIN  Lab Results   Component Value Date    [id number] 888 08/16/2022     Sleep studies:  None                                       DO Jenna Barrow 73 Sleep Physician

## 2023-01-18 ENCOUNTER — OFFICE VISIT (OUTPATIENT)
Dept: FAMILY MEDICINE CLINIC | Facility: CLINIC | Age: 58
End: 2023-01-18

## 2023-01-18 VITALS
DIASTOLIC BLOOD PRESSURE: 70 MMHG | WEIGHT: 208 LBS | HEIGHT: 65 IN | BODY MASS INDEX: 34.66 KG/M2 | SYSTOLIC BLOOD PRESSURE: 137 MMHG | OXYGEN SATURATION: 97 % | TEMPERATURE: 98.7 F | HEART RATE: 82 BPM | RESPIRATION RATE: 18 BRPM

## 2023-01-18 DIAGNOSIS — H66.90 ACUTE OTITIS MEDIA, UNSPECIFIED OTITIS MEDIA TYPE: Primary | ICD-10-CM

## 2023-01-18 RX ORDER — AMOXICILLIN AND CLAVULANATE POTASSIUM 875; 125 MG/1; MG/1
1 TABLET, FILM COATED ORAL EVERY 12 HOURS SCHEDULED
Qty: 20 TABLET | Refills: 0 | Status: SHIPPED | OUTPATIENT
Start: 2023-01-18 | End: 2023-01-28

## 2023-01-18 NOTE — PROGRESS NOTES
[facility]        NAME: Laila Rodriguez is a 61 y o  female  : 1965    MRN: [id number]  DATE: 2023  TIME: 3:18 PM    Assessment and Plan   Acute otitis media, unspecified otitis media type [H66 90]  1  Acute otitis media, unspecified otitis media type  amoxicillin-clavulanate (AUGMENTIN) 875-125 mg per tablet            Patient Instructions     Patient Instructions   Augmentin as ordered for ear infection  Continue Tylenol/Ibuprofen as directed for pain/inflammation  Call with problems/concerns          Chief Complaint     Chief Complaint   Patient presents with   • Earache     Left ear painful and itchy  Right side minor pain         History of Present Illness       C/o left ear painful and congestion x a few weeks and tried home remedies began just as an itchy sensation but is now painful, post nasal drip   denies fever   HX of RA does acupuncture to help with it had SE from pharmaceutical treatments in the past       Review of Systems   Review of Systems   Constitutional: Positive for fever  Negative for activity change, appetite change, diaphoresis and fatigue  HENT: Positive for congestion, ear pain and postnasal drip  Negative for facial swelling, hearing loss, rhinorrhea, sinus pressure, sinus pain, sneezing, sore throat and voice change  Eyes: Negative for pain, discharge and visual disturbance  Respiratory: Negative for cough, choking, chest tightness, shortness of breath, wheezing and stridor  Cardiovascular: Negative for chest pain, palpitations and leg swelling  Gastrointestinal: Negative for abdominal distention, abdominal pain, constipation, diarrhea, nausea and vomiting  Endocrine: Negative for polydipsia, polyphagia and polyuria  Genitourinary: Negative for difficulty urinating, dysuria, frequency and urgency  Musculoskeletal: Negative for arthralgias, back pain, gait problem, joint swelling, myalgias, neck pain and neck stiffness  Skin: Negative for color change, rash and wound  Neurological: Negative for dizziness, syncope, speech difficulty, weakness, light-headedness and headaches  Hematological: Negative for adenopathy  Does not bruise/bleed easily  Psychiatric/Behavioral: Negative for agitation, behavioral problems, confusion, hallucinations, sleep disturbance and suicidal ideas  The patient is not nervous/anxious  Current Medications       Current Outpatient Medications:   •  albuterol (Proventil HFA) 90 mcg/act inhaler, Inhale 2 puffs every 4 (four) hours as needed for wheezing, Disp: 1 Inhaler, Rfl: 1  •  amoxicillin-clavulanate (AUGMENTIN) 875-125 mg per tablet, Take 1 tablet by mouth every 12 (twelve) hours for 10 days, Disp: 20 tablet, Rfl: 0  •  famotidine (PEPCID) 20 mg tablet, Take 1 tablet (20 mg total) by mouth 2 (two) times a day, Disp: 60 tablet, Rfl: 0    Current Allergies     Allergies as of 2023 - Reviewed 2023   Allergen Reaction Noted   • Cinchonine  2017   • Hydroxychloroquine  2016   • Iv contrast [iodinated contrast media] Throat Swelling 08/10/2021   • Moxifloxacin  2017   • Other  04/10/2015   • Sulfa antibiotics  2017   • Zithromax [azithromycin]  2017            The following portions of the patient's history were reviewed and updated as appropriate: allergies, current medications, past family history, past medical history, past social history, past surgical history and problem list      Past Medical History:   Diagnosis Date   • Arthritis    • Asthma    • Rheumatoid arthritis ([facility] [address] )        Past Surgical History:   Procedure Laterality Date   •  SECTION         Family History   Adopted: Yes   Problem Relation Age of Onset   • Alzheimer's disease Mother          Medications have been verified          Objective   /70 (BP Location: Left arm, Patient Position: Sitting, Cuff Size: Adult)   Pulse 82   Temp 98 7 °F (37 1 °C) (Tympanic)   Resp 18  5' 5" (1 651 m)   Wt 94 3 kg (208 lb)   SpO2 97%   BMI 34 61 kg/m²        Physical Exam     Physical Exam  Vitals and nursing note reviewed  Constitutional:       General: She is not in acute distress  Appearance: She is well-developed  She is obese  She is not diaphoretic  HENT:      Head: Normocephalic and atraumatic  Right Ear: Tympanic membrane, ear canal and external ear normal       Left Ear: Ear canal and external ear normal  Swelling and tenderness present  Tympanic membrane is erythematous and bulging  Nose: Rhinorrhea present  Right Sinus: No maxillary sinus tenderness or frontal sinus tenderness  Left Sinus: No maxillary sinus tenderness or frontal sinus tenderness  Comments: Post nasal drip     Mouth/Throat:      Mouth: Mucous membranes are moist       Pharynx: Uvula midline  Posterior oropharyngeal erythema present  No oropharyngeal exudate  Eyes:      General:         Right eye: No discharge  Left eye: No discharge  Conjunctiva/sclera: Conjunctivae normal       Pupils: Pupils are equal, round, and reactive to light  Neck:      Thyroid: No thyromegaly  Trachea: No tracheal deviation  Cardiovascular:      Rate and Rhythm: Normal rate and regular rhythm  Pulses: Normal pulses  Heart sounds: Normal heart sounds  No murmur heard  No friction rub  No gallop  Pulmonary:      Effort: Pulmonary effort is normal  No respiratory distress  Breath sounds: Normal breath sounds  No wheezing, rhonchi or rales  Abdominal:      General: Bowel sounds are normal  There is no distension  Palpations: Abdomen is soft  There is no mass  Tenderness: There is no abdominal tenderness  There is no guarding or rebound  Musculoskeletal:         General: No tenderness or deformity  Normal range of motion  Cervical back: Normal range of motion and neck supple  No rigidity or tenderness     Lymphadenopathy:      Cervical: Cervical adenopathy present  Skin:     General: Skin is warm and dry  Capillary Refill: Capillary refill takes less than 2 seconds  Findings: No erythema or rash  Neurological:      Mental Status: She is alert and oriented to person, place, and time  Cranial Nerves: No cranial nerve deficit  Coordination: Coordination normal    Psychiatric:         Mood and Affect: Mood normal          Speech: Speech normal          Behavior: Behavior normal          Thought Content:  Thought content normal          Judgment: Judgment normal

## 2023-01-18 NOTE — PATIENT INSTRUCTIONS
Augmentin as ordered for ear infection  Continue Tylenol/Ibuprofen as directed for pain/inflammation  Call with problems/concerns

## 2023-02-28 ENCOUNTER — OFFICE VISIT (OUTPATIENT)
Dept: FAMILY MEDICINE CLINIC | Facility: CLINIC | Age: 58
End: 2023-02-28

## 2023-02-28 VITALS
SYSTOLIC BLOOD PRESSURE: 126 MMHG | BODY MASS INDEX: 34.82 KG/M2 | DIASTOLIC BLOOD PRESSURE: 78 MMHG | TEMPERATURE: 97.1 F | HEART RATE: 76 BPM | OXYGEN SATURATION: 99 % | HEIGHT: 65 IN | WEIGHT: 209 LBS

## 2023-02-28 DIAGNOSIS — L30.8 OTHER ECZEMA: Primary | ICD-10-CM

## 2023-02-28 RX ORDER — TRIAMCINOLONE ACETONIDE 5 MG/G
OINTMENT TOPICAL 2 TIMES DAILY
Qty: 30 G | Refills: 0 | Status: SHIPPED | OUTPATIENT
Start: 2023-02-28

## 2023-02-28 NOTE — PROGRESS NOTES
Assessment/Plan:    No problem-specific Assessment & Plan notes found for this encounter  Diagnoses and all orders for this visit:    Other eczema  -     triamcinolone (KENALOG) 0 5 % ointment; Apply topically 2 (two) times a day          Subjective:   Chief Complaint   Patient presents with   • Earache     Itch  drainage        Patient ID: Ramesh Negron is a 61 y o  female  HPI    The following portions of the patient's history were reviewed and updated as appropriate: allergies, current medications, past family history, past medical history, past social history, past surgical history and problem list     Review of Systems   Constitutional: Negative for fatigue, fever and unexpected weight change  HENT: Negative for congestion, sinus pain and sore throat  Eyes: Negative for visual disturbance  Respiratory: Negative for shortness of breath and wheezing  Cardiovascular: Negative for chest pain and palpitations  Gastrointestinal: Negative for abdominal pain, nausea and vomiting  Musculoskeletal: Negative  Negative for arthralgias and myalgias  Neurological: Negative for syncope, weakness and numbness  Psychiatric/Behavioral: Negative  Negative for confusion, dysphoric mood and suicidal ideas  Objective:  Vitals:    02/28/23 1133   BP: 126/78   BP Location: Left arm   Patient Position: Sitting   Cuff Size: Standard   Pulse: 76   Temp: (!) 97 1 °F (36 2 °C)   TempSrc: Tympanic   SpO2: 99%   Weight: 94 8 kg (209 lb)   Height: 5' 5" (1 651 m)      Physical Exam  Constitutional:       Appearance: She is well-developed  HENT:      Right Ear: Ear canal normal  Tympanic membrane is not injected  Left Ear: Ear canal normal  Tympanic membrane is not injected  Nose: Nose normal    Eyes:      General:         Right eye: No discharge  Left eye: No discharge  Conjunctiva/sclera: Conjunctivae normal       Pupils: Pupils are equal, round, and reactive to light     Neck: Thyroid: No thyromegaly  Cardiovascular:      Rate and Rhythm: Normal rate and regular rhythm  Heart sounds: Normal heart sounds  No murmur heard  Pulmonary:      Effort: Pulmonary effort is normal  No respiratory distress  Breath sounds: Normal breath sounds  No wheezing  Abdominal:      General: Bowel sounds are normal  There is no distension  Palpations: Abdomen is soft  Tenderness: There is no abdominal tenderness  Musculoskeletal:         General: Normal range of motion  Cervical back: Normal range of motion and neck supple  Lymphadenopathy:      Cervical: No cervical adenopathy  Skin:     General: Skin is warm and dry  Neurological:      Mental Status: She is alert and oriented to person, place, and time  She is not disoriented  Sensory: No sensory deficit  Gait: Gait normal       Deep Tendon Reflexes: Reflexes are normal and symmetric  Psychiatric:         Speech: Speech normal          Behavior: Behavior normal          Thought Content:  Thought content normal          Judgment: Judgment normal

## 2023-03-29 ENCOUNTER — OFFICE VISIT (OUTPATIENT)
Dept: FAMILY MEDICINE CLINIC | Facility: CLINIC | Age: 58
End: 2023-03-29

## 2023-03-29 VITALS
OXYGEN SATURATION: 99 % | BODY MASS INDEX: 34.99 KG/M2 | HEART RATE: 98 BPM | HEIGHT: 65 IN | DIASTOLIC BLOOD PRESSURE: 88 MMHG | SYSTOLIC BLOOD PRESSURE: 138 MMHG | WEIGHT: 210 LBS | TEMPERATURE: 98.6 F

## 2023-03-29 DIAGNOSIS — J11.1 INFLUENZA: ICD-10-CM

## 2023-03-29 DIAGNOSIS — M05.711 RHEUMATOID ARTHRITIS INVOLVING RIGHT SHOULDER WITH POSITIVE RHEUMATOID FACTOR (HCC): ICD-10-CM

## 2023-03-29 DIAGNOSIS — J40 BRONCHITIS: Primary | ICD-10-CM

## 2023-03-29 RX ORDER — CEPHALEXIN 500 MG/1
500 CAPSULE ORAL EVERY 8 HOURS SCHEDULED
Qty: 30 CAPSULE | Refills: 0 | Status: SHIPPED | OUTPATIENT
Start: 2023-03-29 | End: 2023-04-08

## 2023-03-29 RX ORDER — ALBUTEROL SULFATE 90 UG/1
2 AEROSOL, METERED RESPIRATORY (INHALATION) EVERY 4 HOURS PRN
Qty: 8 G | Refills: 5 | Status: SHIPPED | OUTPATIENT
Start: 2023-03-29

## 2023-03-29 NOTE — PROGRESS NOTES
"Assessment/Plan:    No problem-specific Assessment & Plan notes found for this encounter  Diagnoses and all orders for this visit:    Bronchitis          Subjective:   Chief Complaint   Patient presents with   • Cough     wheezing        Patient ID: Aliya Auguste is a 61 y o  female  HPI    The following portions of the patient's history were reviewed and updated as appropriate: allergies, current medications, past family history, past medical history, past social history, past surgical history and problem list     Review of Systems   Constitutional: Negative for fatigue, fever and unexpected weight change  HENT: Negative for congestion, sinus pain and sore throat  Eyes: Negative for visual disturbance  Respiratory: Negative for shortness of breath and wheezing  Cardiovascular: Negative for chest pain and palpitations  Gastrointestinal: Negative for abdominal pain, nausea and vomiting  Musculoskeletal: Negative  Negative for arthralgias and myalgias  Neurological: Negative for syncope, weakness and numbness  Psychiatric/Behavioral: Negative  Negative for confusion, dysphoric mood and suicidal ideas  Objective:  Vitals:    03/29/23 1104   BP: 138/88   Pulse: 98   Temp: 98 6 °F (37 °C)   TempSrc: Tympanic   SpO2: 99%   Weight: 95 3 kg (210 lb)   Height: 5' 5\" (1 651 m)      Physical Exam  Constitutional:       Appearance: She is well-developed  HENT:      Right Ear: Ear canal normal  Tympanic membrane is not injected  Left Ear: Ear canal normal  Tympanic membrane is not injected  Nose: Nose normal    Eyes:      General:         Right eye: No discharge  Left eye: No discharge  Conjunctiva/sclera: Conjunctivae normal       Pupils: Pupils are equal, round, and reactive to light  Neck:      Thyroid: No thyromegaly  Cardiovascular:      Rate and Rhythm: Normal rate and regular rhythm  Heart sounds: Normal heart sounds  No murmur heard    Pulmonary:      " Effort: Pulmonary effort is normal  No respiratory distress  Breath sounds: Normal breath sounds  No wheezing  Abdominal:      General: Bowel sounds are normal  There is no distension  Palpations: Abdomen is soft  Tenderness: There is no abdominal tenderness  Musculoskeletal:         General: Normal range of motion  Cervical back: Normal range of motion and neck supple  Lymphadenopathy:      Cervical: No cervical adenopathy  Skin:     General: Skin is warm and dry  Neurological:      Mental Status: She is alert and oriented to person, place, and time  She is not disoriented  Sensory: No sensory deficit  Gait: Gait normal       Deep Tendon Reflexes: Reflexes are normal and symmetric  Psychiatric:         Speech: Speech normal          Behavior: Behavior normal          Thought Content:  Thought content normal          Judgment: Judgment normal

## 2023-08-15 ENCOUNTER — VBI (OUTPATIENT)
Dept: ADMINISTRATIVE | Facility: OTHER | Age: 58
End: 2023-08-15

## 2024-01-10 ENCOUNTER — TELEPHONE (OUTPATIENT)
Dept: OTHER | Facility: OTHER | Age: 59
End: 2024-01-10

## 2024-01-12 ENCOUNTER — OFFICE VISIT (OUTPATIENT)
Dept: FAMILY MEDICINE CLINIC | Facility: CLINIC | Age: 59
End: 2024-01-12
Payer: COMMERCIAL

## 2024-01-12 VITALS
HEIGHT: 65 IN | TEMPERATURE: 97.6 F | WEIGHT: 202.2 LBS | OXYGEN SATURATION: 99 % | BODY MASS INDEX: 33.69 KG/M2 | HEART RATE: 74 BPM

## 2024-01-12 DIAGNOSIS — R53.83 OTHER FATIGUE: ICD-10-CM

## 2024-01-12 DIAGNOSIS — E53.8 VITAMIN B12 DEFICIENCY: ICD-10-CM

## 2024-01-12 DIAGNOSIS — E78.2 MIXED HYPERLIPIDEMIA: ICD-10-CM

## 2024-01-12 DIAGNOSIS — D50.8 OTHER IRON DEFICIENCY ANEMIA: ICD-10-CM

## 2024-01-12 DIAGNOSIS — I10 PRIMARY HYPERTENSION: ICD-10-CM

## 2024-01-12 DIAGNOSIS — Z00.00 WELLNESS EXAMINATION: Primary | ICD-10-CM

## 2024-01-12 DIAGNOSIS — Z12.11 SCREEN FOR COLON CANCER: ICD-10-CM

## 2024-01-12 DIAGNOSIS — Z12.31 ENCOUNTER FOR SCREENING MAMMOGRAM FOR MALIGNANT NEOPLASM OF BREAST: ICD-10-CM

## 2024-01-12 PROCEDURE — 99214 OFFICE O/P EST MOD 30 MIN: CPT

## 2024-01-12 PROCEDURE — 99396 PREV VISIT EST AGE 40-64: CPT

## 2024-01-12 RX ORDER — VALSARTAN 80 MG/1
80 TABLET ORAL DAILY
Qty: 90 TABLET | Refills: 3 | Status: SHIPPED | OUTPATIENT
Start: 2024-01-12

## 2024-01-12 NOTE — PROGRESS NOTES
ADULT ANNUAL PHYSICAL  [facility]    NAME: Martina Castro  AGE: 58 y.o. SEX: female  : 1965     DATE: 2024     Assessment and Plan:     Problem List Items Addressed This Visit       Primary hypertension     Patient reports increased stress at work for several months. States that she had intermittent headaches and dizziness. Has been trending BPs at home--- -190's, highest . Trial valsartan. Trend BPs with log and follow up in 2 weeks. If not at target increase to 160 mg.         Relevant Medications    valsartan (DIOVAN) 80 mg tablet    Other Relevant Orders    Comprehensive metabolic panel     Other Visit Diagnoses       Wellness examination    -  Primary    Other iron deficiency anemia        Relevant Orders    CBC and differential    Ferritin    Other fatigue        Relevant Orders    T4, free    TSH, 3rd generation    Vitamin B12 deficiency        Relevant Orders    Vitamin B12    Mixed hyperlipidemia        Relevant Orders    Comprehensive metabolic panel    Lipid Panel with Direct LDL reflex    Screen for colon cancer        Relevant Orders    Cologuard    Encounter for screening mammogram for malignant neoplasm of breast        Relevant Orders    Mammo screening bilateral w cad            Immunizations and preventive care screenings were discussed with patient today. Appropriate education was printed on patient's after visit summary.    Counseling:  Exercise: the importance of regular exercise/physical activity was discussed. Recommend exercise 3-5 times per week for at least 30 minutes.       Depression Screening and Follow-up Plan: Patient was screened for depression during today's encounter. They screened negative with a PHQ-2 score of 0.        No follow-ups on file.     Chief Complaint:     Chief Complaint   Patient presents with    Physical Exam      History of Present Illness:     Adult Annual Physical    Patient here for a comprehensive physical exam. The patient reports problems - hypertension . Hypertension  This is a new problem. The current episode started 1 to 4 weeks ago. Associated symptoms include anxiety and headaches. Pertinent negatives include no blurred vision, chest pain, malaise/fatigue, neck pain, orthopnea, palpitations, peripheral edema, PND, shortness of breath or sweats. There are no associated agents to hypertension.         Diet and Physical Activity  Diet/Nutrition: well balanced diet.   Exercise: walking.      Depression Screening  PHQ-2/9 Depression Screening    Little interest or pleasure in doing things: 0 - not at all  Feeling down, depressed, or hopeless: 0 - not at all  Trouble falling or staying asleep, or sleeping too much: 0 - not at all  Feeling tired or having little energy: 0 - not at all  Poor appetite or overeatin - not at all  Feeling bad about yourself - or that you are a failure or have let yourself or your family down: 0 - not at all  Trouble concentrating on things, such as reading the newspaper or watching television: 0 - not at all  Moving or speaking so slowly that other people could have noticed. Or the opposite - being so fidgety or restless that you have been moving around a lot more than usual: 0 - not at all  Thoughts that you would be better off dead, or of hurting yourself in some way: 0 - not at all  PHQ-2 Score: 0  PHQ-2 Interpretation: Negative depression screen  PHQ-9 Score: 0  PHQ-9 Interpretation: No or Minimal depression       General Health  Sleep: sleeps well.   Hearing: normal - bilateral.  Vision: no vision problems and wears glasses.   Dental: regular dental visits.       /GYN Health  Follows with gynecology? no     Advanced Care Planning  Do you have an advanced directive? no  Do you have a durable medical power of ? no     Review of Systems:     Review of Systems   Constitutional:  Negative for activity change and malaise/fatigue.    HENT:  Negative for congestion, ear pain, rhinorrhea and sore throat.    Eyes:  Negative for blurred vision and pain.   Respiratory:  Negative for cough, shortness of breath and wheezing.    Cardiovascular:  Negative for chest pain, palpitations, orthopnea, leg swelling and PND.   Gastrointestinal:  Negative for abdominal pain, diarrhea, nausea and vomiting.   Musculoskeletal:  Negative for arthralgias, myalgias and neck pain.   Skin:  Negative for rash.   Neurological:  Positive for dizziness and headaches. Negative for weakness and numbness.   All other systems reviewed and are negative.     Past Medical History:     Past Medical History:   Diagnosis Date    Arthritis     Asthma     Rheumatoid arthritis (HCC)       Past Surgical History:     Past Surgical History:   Procedure Laterality Date     SECTION        Social History:     Social History     Socioeconomic History    Marital status: /Civil Union     Spouse name: None    Number of children: None    Years of education: None    Highest education level: None   Occupational History    None   Tobacco Use    Smoking status: Never    Smokeless tobacco: Never   Vaping Use    Vaping status: Never Used   Substance and Sexual Activity    Alcohol use: Yes     Comment: socially    Drug use: No    Sexual activity: Yes     Partners: Male   Other Topics Concern    None   Social History Narrative    None     Social Determinants of Health     Financial Resource Strain: Not on file   Food Insecurity: Not on file   Transportation Needs: Not on file   Physical Activity: Not on file   Stress: Not on file   Social Connections: Not on file   Intimate Partner Violence: Not on file   Housing Stability: Not on file      Family History:     Family History   Adopted: Yes   Problem Relation Age of Onset    Alzheimer's disease Mother       Current Medications:     Current Outpatient Medications   Medication Sig Dispense Refill    albuterol (Proventil HFA) 90 mcg/act  "inhaler Inhale 2 puffs every 4 (four) hours as needed for wheezing 8 g 5    triamcinolone (KENALOG) 0.5 % ointment Apply topically 2 (two) times a day 30 g 0    valsartan (DIOVAN) 80 mg tablet Take 1 tablet (80 mg total) by mouth daily 90 tablet 3    famotidine (PEPCID) 20 mg tablet Take 1 tablet (20 mg total) by mouth 2 (two) times a day 60 tablet 0     No current facility-administered medications for this visit.      Allergies:     Allergies   Allergen Reactions    Cinchonine     Hydroxychloroquine     Iv Contrast [Iodinated Contrast Media] Throat Swelling    Moxifloxacin     Other      Seasonal allergies    Sulfa Antibiotics     Zithromax [Azithromycin]       Physical Exam:     Pulse 74   Temp 97.6 °F (36.4 °C) (Tympanic)   Ht 5' 5\" (1.651 m)   Wt 91.7 kg (202 lb 3.2 oz)   SpO2 99%   BMI 33.65 kg/m²     Physical Exam  Vitals and nursing note reviewed.   Constitutional:       General: She is not in acute distress.     Appearance: Normal appearance. She is well-developed.   HENT:      Head: Normocephalic and atraumatic.   Eyes:      Conjunctiva/sclera: Conjunctivae normal.   Cardiovascular:      Rate and Rhythm: Normal rate and regular rhythm.      Heart sounds: No murmur heard.  Pulmonary:      Effort: Pulmonary effort is normal. No respiratory distress.      Breath sounds: Normal breath sounds. No wheezing.   Abdominal:      Palpations: Abdomen is soft.      Tenderness: There is no abdominal tenderness.   Musculoskeletal:         General: No swelling.      Cervical back: Neck supple.   Skin:     General: Skin is warm and dry.      Capillary Refill: Capillary refill takes less than 2 seconds.   Neurological:      General: No focal deficit present.      Mental Status: She is alert and oriented to person, place, and time. Mental status is at baseline.   Psychiatric:         Mood and Affect: Mood normal.          MARVIN Rodríguez  [facility]    "

## 2024-01-12 NOTE — ASSESSMENT & PLAN NOTE
Patient reports increased stress at work for several months. States that she had intermittent headaches and dizziness. Has been trending BPs at home--- -190's, highest . Trial valsartan. Trend BPs with log and follow up in 2 weeks. If not at target increase to 160 mg.

## 2024-01-29 ENCOUNTER — OFFICE VISIT (OUTPATIENT)
Dept: FAMILY MEDICINE CLINIC | Facility: CLINIC | Age: 59
End: 2024-01-29
Payer: COMMERCIAL

## 2024-01-29 DIAGNOSIS — I10 PRIMARY HYPERTENSION: ICD-10-CM

## 2024-01-29 DIAGNOSIS — M05.711 RHEUMATOID ARTHRITIS INVOLVING RIGHT SHOULDER WITH POSITIVE RHEUMATOID FACTOR (HCC): ICD-10-CM

## 2024-01-29 PROCEDURE — 99214 OFFICE O/P EST MOD 30 MIN: CPT | Performed by: FAMILY MEDICINE

## 2024-01-29 RX ORDER — VALSARTAN 160 MG/1
160 TABLET ORAL DAILY
Qty: 90 TABLET | Refills: 3 | Status: SHIPPED | OUTPATIENT
Start: 2024-01-29

## 2024-01-29 NOTE — PROGRESS NOTES
Assessment/Plan:    No problem-specific Assessment & Plan notes found for this encounter.       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 female.    HPI    The following portions of the patient's history were reviewed and updated as appropriate: allergies, current medications, past family history, past medical history, past social history, past surgical history and problem list.    Review of Systems   Constitutional:  Negative for fatigue, fever and unexpected weight change.   HENT:  Negative for congestion, sinus pain and sore throat.    Eyes:  Negative for visual disturbance.   Respiratory:  Negative for shortness of breath and wheezing.    Cardiovascular:  Negative for chest pain and palpitations.   Gastrointestinal:  Negative for abdominal pain, nausea and vomiting.   Musculoskeletal: Negative.  Negative for arthralgias and myalgias.   Neurological:  Negative for syncope, weakness and numbness.   Psychiatric/Behavioral: Negative.  Negative for confusion, dysphoric mood and suicidal ideas.          Objective:  There were no vitals filed for this visit.   Physical Exam  Constitutional:       Appearance: She is well-developed.   HENT:      Right Ear: Ear canal normal. Tympanic membrane is not injected.      Left Ear: Ear canal normal. Tympanic membrane is not injected.      Nose: Nose normal.   Eyes:      General:         Right eye: No discharge.         Left eye: No discharge.      Conjunctiva/sclera: Conjunctivae normal.      Pupils: Pupils are equal, round, and reactive to light.   Neck:      Thyroid: No thyromegaly.   Cardiovascular:      Rate and Rhythm: Normal rate and regular rhythm.       Heart sounds: Normal heart sounds. No murmur heard.  Pulmonary:      Effort: Pulmonary effort is normal. No respiratory distress.      Breath sounds: Normal breath sounds. No wheezing.   Abdominal:      General: Bowel sounds are normal. There is no distension.      Palpations: Abdomen is soft.      Tenderness: There is no abdominal tenderness.   Musculoskeletal:         General: Normal range of motion.      Cervical back: Normal range of motion and neck supple.   Lymphadenopathy:      Cervical: No cervical adenopathy.   Skin:     General: Skin is warm and dry.   Neurological:      Mental Status: She is alert and oriented to person, place, and time. She is not disoriented.      Sensory: No sensory deficit.      Motor: No weakness.      Coordination: Coordination normal.      Gait: Gait normal.      Deep Tendon Reflexes: Reflexes are normal and symmetric.   Psychiatric:         Speech: Speech normal.         Behavior: Behavior normal.         Thought Content: Thought content normal.         Judgment: Judgment normal.

## 2024-02-02 ENCOUNTER — TELEPHONE (OUTPATIENT)
Dept: FAMILY MEDICINE CLINIC | Facility: CLINIC | Age: 59
End: 2024-02-02

## 2024-02-02 NOTE — TELEPHONE ENCOUNTER
Pt mentioned that she has kole having diarrhea as well as really bad gas. Pt has also been feeling head coldy as well. Diarrhea has not improved . Pt doubled the valsartan after appointment  on Monday.     [facility] in [place] [phone].       Does not know if it is medication or stomach bug.       Tested negative for flu and Covid.           Pt needs a back to work note as well hoping to go back to work tomorrow     Needs to be excused and  2/1/24 and 2/2/24      Ok to write ?

## 2024-02-08 RX ORDER — VALSARTAN 80 MG/1
80 TABLET ORAL DAILY
Qty: 30 TABLET | Refills: 11 | OUTPATIENT
Start: 2024-02-08

## 2024-02-13 LAB
ALBUMIN SERPL-MCNC: 4.3 G/DL (ref 3.8–4.9)
ALBUMIN/GLOB SERPL: 1.7 {RATIO} (ref 1.2–2.2)
ALP SERPL-CCNC: 120 IU/L (ref 44–121)
ALT SERPL-CCNC: 22 IU/L (ref 0–32)
AST SERPL-CCNC: 17 IU/L (ref 0–40)
BASOPHILS # BLD AUTO: 0.1 X10E3/UL (ref 0–0.2)
BASOPHILS NFR BLD AUTO: 1 %
BILIRUB SERPL-MCNC: 0.3 MG/DL (ref 0–1.2)
BUN SERPL-MCNC: 18 MG/DL (ref 6–24)
BUN/CREAT SERPL: 20 (ref 9–23)
CALCIUM SERPL-MCNC: 9.5 MG/DL (ref 8.7–10.2)
CHLORIDE SERPL-SCNC: 105 MMOL/L (ref 96–106)
CHOLEST SERPL-MCNC: 223 MG/DL (ref 100–199)
CO2 SERPL-SCNC: 22 MMOL/L (ref 20–29)
CREAT SERPL-MCNC: 0.89 MG/DL (ref 0.57–1)
EGFR: 75 ML/MIN/1.73
EOSINOPHIL # BLD AUTO: 0.4 X10E3/UL (ref 0–0.4)
EOSINOPHIL NFR BLD AUTO: 6 %
ERYTHROCYTE [DISTWIDTH] IN BLOOD BY AUTOMATED COUNT: 12.4 % (ref 11.7–15.4)
FERRITIN SERPL-MCNC: 104 NG/ML (ref 15–150)
GLOBULIN SER-MCNC: 2.6 G/DL (ref 1.5–4.5)
GLUCOSE SERPL-MCNC: 108 MG/DL (ref 70–99)
HCT VFR BLD AUTO: 43.3 % (ref 34–46.6)
HDLC SERPL-MCNC: 53 MG/DL
HGB BLD-MCNC: 14.2 G/DL (ref 11.1–15.9)
IMM GRANULOCYTES # BLD: 0 X10E3/UL (ref 0–0.1)
IMM GRANULOCYTES NFR BLD: 0 %
LDLC SERPL CALC-MCNC: 154 MG/DL (ref 0–99)
LDLC/HDLC SERPL: 2.9 RATIO (ref 0–3.2)
LYMPHOCYTES # BLD AUTO: 1.8 X10E3/UL (ref 0.7–3.1)
LYMPHOCYTES NFR BLD AUTO: 28 %
MCH RBC QN AUTO: 28.3 PG (ref 26.6–33)
MCHC RBC AUTO-ENTMCNC: 32.8 G/DL (ref 31.5–35.7)
MCV RBC AUTO: 86 FL (ref 79–97)
MONOCYTES # BLD AUTO: 0.5 X10E3/UL (ref 0.1–0.9)
MONOCYTES NFR BLD AUTO: 7 %
NEUTROPHILS # BLD AUTO: 3.8 X10E3/UL (ref 1.4–7)
NEUTROPHILS NFR BLD AUTO: 58 %
PLATELET # BLD AUTO: 308 X10E3/UL (ref 150–450)
POTASSIUM SERPL-SCNC: 4.4 MMOL/L (ref 3.5–5.2)
PROT SERPL-MCNC: 6.9 G/DL (ref 6–8.5)
RBC # BLD AUTO: 5.02 X10E6/UL (ref 3.77–5.28)
SL AMB VLDL CHOLESTEROL CALC: 16 MG/DL (ref 5–40)
SODIUM SERPL-SCNC: 144 MMOL/L (ref 134–144)
T4 FREE SERPL-MCNC: 1.46 NG/DL (ref 0.82–1.77)
TRIGL SERPL-MCNC: 92 MG/DL (ref 0–149)
TSH SERPL DL<=0.005 MIU/L-ACNC: 2.31 UIU/ML (ref 0.45–4.5)
VIT B12 SERPL-MCNC: 340 PG/ML (ref 232–1245)
WBC # BLD AUTO: 6.6 X10E3/UL (ref 3.4–10.8)

## 2024-02-14 ENCOUNTER — TELEPHONE (OUTPATIENT)
Dept: FAMILY MEDICINE CLINIC | Facility: CLINIC | Age: 59
End: 2024-02-14

## 2024-02-14 NOTE — TELEPHONE ENCOUNTER
Patient notified as per MARVIN Atkinson  Reviewed lab results #'s with her and she wrote all recommendations down.    B12 low normal, OK to take daily OTC B12 - 1000mcg. If desires.      ----- Message from MARVIN Rodríguez sent at 2/13/2024  8:29 AM EST -----  Please notify patient:    Fasting glucose, cholesterol, and LDL were elevated. We should trend every 6 months-1 year. As of right now manage with healthy lifestyle modifications---  Recommend healthy diet: lean proteins, veggies, fruits, whole grains, and legumes. Increase physical activity as tolerated. Maintain healthy body weight. If lipids continue to elevate, may need to discuss statin therapy in the future to decrease cardiac event risk.

## 2024-02-22 ENCOUNTER — TELEPHONE (OUTPATIENT)
Dept: FAMILY MEDICINE CLINIC | Facility: CLINIC | Age: 59
End: 2024-02-22

## 2024-02-22 LAB — COLOGUARD RESULT REPORTABLE: NEGATIVE

## 2024-02-22 NOTE — TELEPHONE ENCOUNTER
----- Message from MARVIN Rodríguez sent at 2/22/2024 12:07 PM EST -----  Please notify patient:    Cologuard was negative. Good for 3 years.   ----- Message -----  From: Archana Jeffery [facility] Lab Results In  Sent: 2/13/2024   6:06 AM EST  To: MARVIN Rodríguez

## 2024-04-30 ENCOUNTER — OFFICE VISIT (OUTPATIENT)
Dept: FAMILY MEDICINE CLINIC | Facility: CLINIC | Age: 59
End: 2024-04-30
Payer: COMMERCIAL

## 2024-04-30 VITALS
TEMPERATURE: 97.1 F | BODY MASS INDEX: 32.62 KG/M2 | SYSTOLIC BLOOD PRESSURE: 130 MMHG | HEART RATE: 82 BPM | OXYGEN SATURATION: 96 % | WEIGHT: 196 LBS | DIASTOLIC BLOOD PRESSURE: 80 MMHG

## 2024-04-30 DIAGNOSIS — R10.32 LEFT LOWER QUADRANT ABDOMINAL PAIN: Primary | ICD-10-CM

## 2024-04-30 DIAGNOSIS — H65.90 FLUID COLLECTION OF MIDDLE EAR: ICD-10-CM

## 2024-04-30 PROCEDURE — 99213 OFFICE O/P EST LOW 20 MIN: CPT | Performed by: NURSE PRACTITIONER

## 2024-04-30 NOTE — PATIENT INSTRUCTIONS
Abdominal Ultrasound as ordered  If pain worsens go to ED for evaluation    Use OTC Flonase and allergy medication daily to treat symptoms

## 2024-04-30 NOTE — PROGRESS NOTES
Name: Martina Castro      : 1965      MRN: [id number]  Encounter Provider: MARVIN Sue  Encounter Date: 2024   Encounter department: [facility]    Assessment & Plan     1. Left lower quadrant abdominal pain  -     US abdomen complete; Future; Expected date: 2024        2. Fluid collection of middle ear    -Use OTC Flonase  and daily allergy medication such as Zyrtec or Claritin to treat allergy symptoms           Subjective      C/o left lower abdominal pain started last night-pain was sharp. No N/V, no diarrhea or constipation.    C/o fullness in left ear, no pain, no drainage.    Abdominal Pain  This is a new problem. The current episode started yesterday. The onset quality is sudden. The problem occurs constantly. The problem has been waxing and waning. The pain is located in the LLQ. The quality of the pain is sharp. The abdominal pain does not radiate. Associated symptoms include belching and flatus. Pertinent negatives include no constipation, diarrhea, dysuria, fever, frequency, hematuria, nausea or vomiting. Nothing aggravates the pain. She has tried nothing for the symptoms. There is no history of colon cancer, Crohn's disease, irritable bowel syndrome, pancreatitis, PUD or ulcerative colitis.     Review of Systems   Constitutional:  Negative for activity change and fever.   HENT:  Negative for congestion, ear discharge, ear pain and facial swelling.         Fullness left ear   Respiratory:  Negative for chest tightness and shortness of breath.    Cardiovascular:  Negative for chest pain.   Gastrointestinal:  Positive for abdominal pain and flatus. Negative for blood in stool, constipation, diarrhea, nausea and vomiting.   Genitourinary:  Negative for difficulty urinating, dysuria, flank pain, frequency, hematuria and urgency.   Neurological:  Negative for dizziness and weakness.       Current Outpatient Medications on File Prior to Visit    Medication Sig    albuterol (Proventil HFA) 90 mcg/act inhaler Inhale 2 puffs every 4 (four) hours as needed for wheezing    triamcinolone (KENALOG) 0.5 % ointment Apply topically 2 (two) times a day    valsartan (DIOVAN) 160 mg tablet Take 1 tablet (160 mg total) by mouth daily    famotidine (PEPCID) 20 mg tablet Take 1 tablet (20 mg total) by mouth 2 (two) times a day       Objective     /80 (BP Location: Left arm, Patient Position: Sitting, Cuff Size: Standard)   Pulse 82   Temp (!) 97.1 °F (36.2 °C) (Tympanic)   Wt 88.9 kg (196 lb)   SpO2 96%   BMI 32.62 kg/m²     Physical Exam  Vitals and nursing note reviewed.   Constitutional:       General: She is not in acute distress.     Appearance: She is well-developed. She is not ill-appearing, toxic-appearing or diaphoretic.   HENT:      Head: Normocephalic.      Ears:      Comments: Fluid b/l TM  Eyes:      Extraocular Movements: Extraocular movements intact.   Cardiovascular:      Rate and Rhythm: Normal rate and regular rhythm.      Heart sounds: Normal heart sounds.   Pulmonary:      Effort: Pulmonary effort is normal. No respiratory distress.      Breath sounds: Normal breath sounds. No wheezing or rhonchi.   Abdominal:      General: Bowel sounds are normal. There is no distension.      Palpations: Abdomen is soft.      Tenderness: There is abdominal tenderness in the periumbilical area and left lower quadrant. There is no guarding or rebound.   Skin:     General: Skin is warm and dry.   Neurological:      Mental Status: She is alert and oriented to person, place, and time.   Psychiatric:         Mood and Affect: Mood normal.         Behavior: Behavior normal.       MARVIN Sue

## 2024-05-13 ENCOUNTER — HOSPITAL ENCOUNTER (OUTPATIENT)
Dept: ULTRASOUND IMAGING | Facility: HOSPITAL | Age: 59
Discharge: HOME | End: 2024-05-13

## 2024-05-13 DIAGNOSIS — R10.32 LEFT LOWER QUADRANT ABDOMINAL PAIN: ICD-10-CM

## 2024-05-31 ENCOUNTER — OFFICE VISIT (OUTPATIENT)
Dept: FAMILY MEDICINE CLINIC | Facility: CLINIC | Age: 59
End: 2024-05-31
Payer: COMMERCIAL

## 2024-05-31 VITALS
OXYGEN SATURATION: 96 % | WEIGHT: 194 LBS | SYSTOLIC BLOOD PRESSURE: 130 MMHG | HEART RATE: 83 BPM | DIASTOLIC BLOOD PRESSURE: 88 MMHG | TEMPERATURE: 99.1 F | BODY MASS INDEX: 32.28 KG/M2

## 2024-05-31 DIAGNOSIS — I10 PRIMARY HYPERTENSION: ICD-10-CM

## 2024-05-31 DIAGNOSIS — M06.9 RHEUMATOID ARTHRITIS INVOLVING MULTIPLE SITES, UNSPECIFIED WHETHER RHEUMATOID FACTOR PRESENT (HCC): Primary | ICD-10-CM

## 2024-05-31 PROCEDURE — 99214 OFFICE O/P EST MOD 30 MIN: CPT

## 2024-05-31 RX ORDER — PREDNISONE 20 MG/1
TABLET ORAL
Qty: 15 TABLET | Refills: 0 | Status: SHIPPED | OUTPATIENT
Start: 2024-05-31

## 2024-05-31 NOTE — PROGRESS NOTES
"Ambulatory Visit  Name: Martina Castro      : 1965      MRN: [id number]  Encounter Provider: MARVIN Rodríguez  Encounter Date: 2024   Encounter department: [facility]    Assessment & Plan   1. 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 Reports that she had been in remission but since starting the job \"my symptoms have really flared.\" Was previously under the care of rheum. States that she was on methotrexate but \"it made me feel like a zombie.\" States that she gets acupuncture monthly which tends to alleviate or prevent flares. Patient plans to resign from her current position. Ordered course of steroids and referred rheum.   Orders:  -     predniSONE 20 mg tablet; TAKE 1 TABLET BID X 5 DAYS THEN TAKE 1 TABLET QD FOR 5 DAYS  -     Ambulatory Referral to Rheumatology; Future  2. Primary hypertension  Assessment & Plan:  BPs stable. Not currently taking any medication.       Depression Screening and Follow-up Plan: Patient was screened for depression during today's encounter. They screened negative with a PHQ-2 score of 1.      History of Present Illness     Arthritis  Presents for follow-up visit. She complains of pain and stiffness. She reports no joint swelling or joint warmth. The symptoms have been worsening. Affected locations include the right shoulder, left shoulder, right foot, left foot, right knee, left knee, right elbow, left elbow, right wrist, left wrist, right ankle, left ankle and neck. Pain scale currently: fluctuates but has been severe. Associated symptoms include fatigue and pain at night. Pertinent negatives include no diarrhea, fever or rash.       Review of Systems   Constitutional:  Positive for fatigue. Negative for activity change and fever.   HENT:  Negative for congestion, " ear pain, rhinorrhea and sore throat.    Eyes:  Negative for pain.   Respiratory:  Negative for cough, shortness of breath and wheezing.    Cardiovascular:  Negative for chest pain and leg swelling.   Gastrointestinal:  Negative for abdominal pain, diarrhea, nausea and vomiting.   Musculoskeletal:  Positive for arthralgias, arthritis and stiffness. Negative for joint swelling and myalgias.   Skin:  Negative for rash.   Neurological:  Negative for dizziness, weakness and numbness.   Psychiatric/Behavioral:  Positive for sleep disturbance. Negative for dysphoric mood. The patient is not nervous/anxious.    All other systems reviewed and are negative.      Objective     /88 (BP Location: Left arm, Patient Position: Sitting, Cuff Size: Standard)   Pulse 83   Temp 99.1 °F (37.3 °C) (Tympanic)   Wt 88 kg (194 lb)   SpO2 96%   BMI 32.28 kg/m²     Physical Exam  Vitals and nursing note reviewed.   Constitutional:       General: She is not in acute distress.     Appearance: Normal appearance. She is well-developed.   HENT:      Head: Normocephalic and atraumatic.      Right Ear: External ear normal.      Left Ear: External ear normal.   Cardiovascular:      Rate and Rhythm: Normal rate and regular rhythm.      Heart sounds: Normal heart sounds. No murmur heard.  Pulmonary:      Effort: Pulmonary effort is normal. No respiratory distress.      Breath sounds: Normal breath sounds. No wheezing.   Abdominal:      General: Bowel sounds are normal. There is no distension.      Palpations: Abdomen is soft.      Tenderness: There is no abdominal tenderness.   Musculoskeletal:         General: No swelling.      Cervical back: Neck supple.      Comments: Overall joint tenderness and decreased ROM   Skin:     General: Skin is warm and dry.      Capillary Refill: Capillary refill takes less than 2 seconds.   Neurological:      Mental Status: She is alert and oriented to person, place, and time. Mental status is at baseline.    Psychiatric:         Mood and Affect: Mood normal.       Administrative Statements   I have spent a total time of 30 minutes on 05/31/24 In caring for this patient including Risks and benefits of tx options, Instructions for management, Patient and family education, Importance of tx compliance, Risk factor reductions, Documenting in the medical record, Reviewing / ordering tests, medicine, procedures  , and Obtaining or reviewing history  .

## 2024-05-31 NOTE — ASSESSMENT & PLAN NOTE
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 Reports that she had been in remission but since starting the job \"my symptoms have really flared.\" Was previously under the care of rheum. States that she was on methotrexate but \"it made me feel like a zombie.\" States that she gets acupuncture monthly which tends to alleviate or prevent flares. Patient plans to resign from her current position. Ordered course of steroids and referred rheum.   "

## 2024-08-19 ENCOUNTER — OFFICE VISIT (OUTPATIENT)
Dept: FAMILY MEDICINE CLINIC | Facility: CLINIC | Age: 59
End: 2024-08-19
Payer: COMMERCIAL

## 2024-08-19 VITALS
BODY MASS INDEX: 32.28 KG/M2 | HEART RATE: 90 BPM | WEIGHT: 194 LBS | SYSTOLIC BLOOD PRESSURE: 124 MMHG | DIASTOLIC BLOOD PRESSURE: 80 MMHG | OXYGEN SATURATION: 96 % | TEMPERATURE: 97.5 F

## 2024-08-19 DIAGNOSIS — M06.9 RHEUMATOID ARTHRITIS INVOLVING MULTIPLE SITES, UNSPECIFIED WHETHER RHEUMATOID FACTOR PRESENT (HCC): Primary | ICD-10-CM

## 2024-08-19 DIAGNOSIS — E53.8 VITAMIN B12 DEFICIENCY: ICD-10-CM

## 2024-08-19 DIAGNOSIS — R53.83 OTHER FATIGUE: ICD-10-CM

## 2024-08-19 DIAGNOSIS — E61.1 IRON DEFICIENCY: ICD-10-CM

## 2024-08-19 PROCEDURE — 99214 OFFICE O/P EST MOD 30 MIN: CPT

## 2024-08-19 RX ORDER — PREDNISONE 20 MG/1
TABLET ORAL
Qty: 15 TABLET | Refills: 0 | Status: SHIPPED | OUTPATIENT
Start: 2024-08-19

## 2024-08-19 NOTE — PROGRESS NOTES
"Ambulatory Visit  Name: Martina Castro      : 1965      MRN: [id number]  Encounter Provider: MARVIN Rodríguez  Encounter Date: 2024   Encounter department: [facility]    Assessment & Plan   1. Rheumatoid arthritis involving multiple sites, unspecified whether rheumatoid factor present (HCC)  Assessment & Plan:  Patient reports RA flare. States that she has been unable to get to restroom-- can't make it there in time and wets herself. Has difficulty with IADLs including cleaning and doing laundry due to pain with movement and weight bearing. \"I can't even make coffee without modifying because I can't lift a glass because of my shoulder. I've lost the ability to do the things that I enjoy like playing guitar and writing. I can't write more than a paragraph before my fingers are killing me.\"    Hasn't been able to work due to pain-- quit last 3 jobs due to pain with walking/twisting/standing. States, \"I don't even know what I can do at this point. I was thinking maybe  but even sitting hurts and then when I go to stand, its excruciating.\"     Patient has not been able to get an appt with rheum. Will call today. Update lab work. Reports increasing fatigue-- states, \"I don't know if it is from the RA and being in pain or if it is some sort of deficiency.\" Ordered course of prednisone-- has been effective in the past.      Orders:  -     Comprehensive metabolic panel; Future  -     C-reactive protein; Future  -     Sedimentation rate, automated; Future  -     predniSONE 20 mg tablet; TAKE 1 TABLET BID X 5 DAYS THEN TAKE 1 TABLET QD FOR 5 DAYS  -     Comprehensive metabolic panel  -     C-reactive protein  -     Sedimentation rate, automated  -     Creatine Kinase, Total; Future  2. Other fatigue  -     CBC and differential; Future  -     Comprehensive metabolic panel; Future  -     T4, free; Future  -     TSH, 3rd generation; Future  -     CBC and " differential  -     Comprehensive metabolic panel  -     T4, free  -     TSH, 3rd generation  3. Vitamin B12 deficiency  -     Vitamin B12; Future  -     Vitamin B12  4. Iron deficiency  -     Ferritin; Future  -     Iron, TotalÂ and Total Iron Binding Capacity (REFL); Future  -     Ferritin  -     Iron, TotalÂ and Total Iron Binding Capacity (REFL)       History of Present Illness     Arthritis  Presents for follow-up visit. She complains of pain and stiffness. She reports no joint swelling or joint warmth. The symptoms have been worsening. Affected location: generalized. Associated symptoms include pain at night and pain while resting. Pertinent negatives include no diarrhea, dry eyes, dry mouth, dysuria, fatigue, fever, rash, Raynaud's syndrome, uveitis or weight loss.       Review of Systems   Constitutional:  Negative for activity change, fatigue, fever and weight loss.   HENT:  Negative for congestion, ear pain, rhinorrhea and sore throat.    Eyes:  Negative for pain.   Respiratory:  Negative for cough, shortness of breath and wheezing.    Cardiovascular:  Negative for chest pain and leg swelling.   Gastrointestinal:  Negative for abdominal pain, diarrhea, nausea and vomiting.   Genitourinary:  Negative for dysuria.   Musculoskeletal:  Positive for arthralgias, arthritis and stiffness. Negative for joint swelling and myalgias.   Skin:  Negative for rash.   Neurological:  Negative for dizziness, weakness and numbness.   All other systems reviewed and are negative.    Objective     /80 (BP Location: Left arm, Patient Position: Sitting, Cuff Size: Standard)   Pulse 90   Temp 97.5 °F (36.4 °C) (Tympanic)   Wt 88 kg (194 lb)   SpO2 96%   BMI 32.28 kg/m²     Physical Exam  Vitals and nursing note reviewed.   Constitutional:       General: She is not in acute distress.     Appearance: Normal appearance. She is well-developed. She is not ill-appearing.   HENT:      Head: Normocephalic and atraumatic.       Right Ear: External ear normal.      Left Ear: External ear normal.   Cardiovascular:      Rate and Rhythm: Normal rate and regular rhythm.      Heart sounds: Normal heart sounds. No murmur heard.  Pulmonary:      Effort: Pulmonary effort is normal. No respiratory distress.      Breath sounds: Normal breath sounds. No wheezing.   Abdominal:      General: Bowel sounds are normal. There is no distension.      Palpations: Abdomen is soft.      Tenderness: There is no abdominal tenderness.   Musculoskeletal:         General: Tenderness present. No swelling or deformity.      Cervical back: Neck supple.      Right lower leg: No edema.      Left lower leg: No edema.   Skin:     General: Skin is warm and dry.      Capillary Refill: Capillary refill takes less than 2 seconds.   Neurological:      Mental Status: She is alert and oriented to person, place, and time. Mental status is at baseline.      Gait: Gait abnormal (stiff knees/hips).   Psychiatric:         Mood and Affect: Mood normal.       Administrative Statements   I have spent a total time of 30 minutes in caring for this patient on the day of the visit/encounter including Risks and benefits of tx options, Instructions for management, Patient and family education, Importance of tx compliance, Risk factor reductions, Impressions, Documenting in the medical record, Reviewing / ordering tests, medicine, procedures  , and Obtaining or reviewing history  .

## 2024-08-19 NOTE — ASSESSMENT & PLAN NOTE
"Patient reports RA flare. States that she has been unable to get to restroom-- can't make it there in time and wets herself. Has difficulty with IADLs including cleaning and doing laundry due to pain with movement and weight bearing. \"I can't even make coffee without modifying because I can't lift a glass because of my shoulder. I've lost the ability to do the things that I enjoy like playing guitar and writing. I can't write more than a paragraph before my fingers are killing me.\"    Hasn't been able to work due to pain-- quit last 3 jobs due to pain with walking/twisting/standing. States, \"I don't even know what I can do at this point. I was thinking maybe  but even sitting hurts and then when I go to stand, its excruciating.\"     Patient has not been able to get an appt with rheum. Will call today. Update lab work. Reports increasing fatigue-- states, \"I don't know if it is from the RA and being in pain or if it is some sort of deficiency.\" Ordered course of prednisone-- has been effective in the past.      "

## 2024-08-21 LAB
ALBUMIN SERPL-MCNC: 4.3 G/DL (ref 3.6–5.1)
ALBUMIN/GLOB SERPL: 1.6 (CALC) (ref 1–2.5)
ALP SERPL-CCNC: 96 U/L (ref 37–153)
ALT SERPL-CCNC: 17 U/L (ref 6–29)
AST SERPL-CCNC: 13 U/L (ref 10–35)
BASOPHILS # BLD AUTO: 43 CELLS/UL (ref 0–200)
BASOPHILS NFR BLD AUTO: 0.5 %
BILIRUB SERPL-MCNC: 0.4 MG/DL (ref 0.2–1.2)
BUN SERPL-MCNC: 19 MG/DL (ref 7–25)
BUN/CREAT SERPL: NORMAL (CALC) (ref 6–22)
CALCIUM SERPL-MCNC: 9.6 MG/DL (ref 8.6–10.4)
CHLORIDE SERPL-SCNC: 105 MMOL/L (ref 98–110)
CO2 SERPL-SCNC: 27 MMOL/L (ref 20–32)
CREAT SERPL-MCNC: 0.88 MG/DL (ref 0.5–1.05)
CRP SERPL-MCNC: 9.8 MG/L
EOSINOPHIL # BLD AUTO: 43 CELLS/UL (ref 15–500)
EOSINOPHIL NFR BLD AUTO: 0.5 %
ERYTHROCYTE [DISTWIDTH] IN BLOOD BY AUTOMATED COUNT: 12.6 % (ref 11–15)
ERYTHROCYTE [SEDIMENTATION RATE] IN BLOOD BY WESTERGREN METHOD: 29 MM/H
FERRITIN SERPL-MCNC: 77 NG/ML (ref 16–232)
GFR/BSA.PRED SERPLBLD CYS-BASED-ARV: 75 ML/MIN/1.73M2
GLOBULIN SER CALC-MCNC: 2.7 G/DL (CALC) (ref 1.9–3.7)
GLUCOSE SERPL-MCNC: 90 MG/DL (ref 65–99)
HCT VFR BLD AUTO: 41.4 % (ref 35–45)
HGB BLD-MCNC: 13.4 G/DL (ref 11.7–15.5)
IRON SATN MFR SERPL: 30 % (CALC) (ref 16–45)
IRON SERPL-MCNC: 107 MCG/DL (ref 45–160)
LYMPHOCYTES # BLD AUTO: 2107 CELLS/UL (ref 850–3900)
LYMPHOCYTES NFR BLD AUTO: 24.5 %
MCH RBC QN AUTO: 28.7 PG (ref 27–33)
MCHC RBC AUTO-ENTMCNC: 32.4 G/DL (ref 32–36)
MCV RBC AUTO: 88.7 FL (ref 80–100)
MONOCYTES # BLD AUTO: 671 CELLS/UL (ref 200–950)
MONOCYTES NFR BLD AUTO: 7.8 %
NEUTROPHILS # BLD AUTO: 5736 CELLS/UL (ref 1500–7800)
NEUTROPHILS NFR BLD AUTO: 66.7 %
PLATELET # BLD AUTO: 329 THOUSAND/UL (ref 140–400)
PMV BLD REES-ECKER: 9.1 FL (ref 7.5–12.5)
POTASSIUM SERPL-SCNC: 3.8 MMOL/L (ref 3.5–5.3)
PROT SERPL-MCNC: 7 G/DL (ref 6.1–8.1)
RBC # BLD AUTO: 4.67 MILLION/UL (ref 3.8–5.1)
SODIUM SERPL-SCNC: 142 MMOL/L (ref 135–146)
T4 FREE SERPL-MCNC: 1.5 NG/DL (ref 0.8–1.8)
TIBC SERPL-MCNC: 354 MCG/DL (CALC) (ref 250–450)
TSH SERPL-ACNC: 0.87 MIU/L (ref 0.4–4.5)
VIT B12 SERPL-MCNC: 553 PG/ML (ref 200–1100)
WBC # BLD AUTO: 8.6 THOUSAND/UL (ref 3.8–10.8)

## 2024-09-03 ENCOUNTER — NURSE TRIAGE (OUTPATIENT)
Age: 59
End: 2024-09-03

## 2024-09-03 DIAGNOSIS — J11.1 INFLUENZA: ICD-10-CM

## 2024-09-03 NOTE — TELEPHONE ENCOUNTER
Medication: Albuterol Inhaler     Dose/Frequency: 90 mcg     Quantity: 8 g     Pharmacy: [facility]     Office:   [x] PCP/Provider -   [] Speciality/Provider -     Does the patient have enough for 3 days?   [x] Yes   [] No - Send as HP to POD

## 2024-09-03 NOTE — TELEPHONE ENCOUNTER
"Pt had an episode over the weekend on Saturday where she felt her HR speed up and skip a beat. She became very dizzy and had to shut her eyes and hold onto something until it passed which took a few minutes. Pt states she went home once it passed and checked her BP and it was 140s and then 20 minutes later it was 120s back to normal. Pt didn't think this was BP related. Pt is wondering if this event was because of a family hx of Afib. Pt feels fine since and hasn't had any more events since.     Pt is wondering if there is a monitor she can wear to see if we can catch this happening or find out what the cause of this would be. Looking for further advisement from Dr. Francisco on what he suggests.     Please call pt back with further advisement.     Reason for Disposition   Palpitations    Answer Assessment - Initial Assessment Questions  1. DESCRIPTION: \"Please describe your heart rate or heartbeat that you are having\" (e.g., fast/slow, regular/irregular, skipped or extra beats, \"palpitations\")      Pt states that she felt like it might have increased or missed a beat for about a min. Then pt also felt dizzy with this. Pt is unsure but did mention a family hx of afib   2. ONSET: \"When did it start?\" (Minutes, hours or days)       Saturday   3. DURATION: \"How long does it last\" (e.g., seconds, minutes, hours)      Couple mins   4. PATTERN \"Does it come and go, or has it been constant since it started?\"  \"Does it get worse with exertion?\"   \"Are you feeling it now?\"      Just that one time on Saturday, however, pt felt fatigued then all weekend after.   5. TAP: \"Using your hand, can you tap out what you are feeling on a chair or table in front of you, so that I can hear?\" (Note: not all patients can do this)        Normal now   6. HEART RATE: \"Can you tell me your heart rate?\" \"How many beats in 15 seconds?\"  (Note: not all patients can do this)        Feels normal now   7. RECURRENT SYMPTOM: \"Have you ever had this " "before?\" If Yes, ask: \"When was the last time?\" and \"What happened that time?\"       Not exactly like this-7 months ago and was r/t high BP   8. CAUSE: \"What do you think is causing the palpitations?\"      Unsure   9. CARDIAC HISTORY: \"Do you have any history of heart disease?\" (e.g., heart attack, angina, bypass surgery, angioplasty, arrhythmia)       Family hx of afib but nothing personally   10. OTHER SYMPTOMS: \"Do you have any other symptoms?\" (e.g., dizziness, chest pain, sweating, difficulty breathing)        Nothing currently but in the moment pt felt dizzy    Protocols used: Heart Rate and Heartbeat Questions-ADULT-[place]    "

## 2024-09-05 ENCOUNTER — OFFICE VISIT (OUTPATIENT)
Dept: FAMILY MEDICINE CLINIC | Facility: CLINIC | Age: 59
End: 2024-09-05
Payer: COMMERCIAL

## 2024-09-05 VITALS
DIASTOLIC BLOOD PRESSURE: 84 MMHG | SYSTOLIC BLOOD PRESSURE: 122 MMHG | HEIGHT: 65 IN | HEART RATE: 84 BPM | WEIGHT: 196 LBS | BODY MASS INDEX: 32.65 KG/M2 | OXYGEN SATURATION: 97 %

## 2024-09-05 DIAGNOSIS — E28.39 OVARIAN FAILURE DUE TO MENOPAUSE: ICD-10-CM

## 2024-09-05 DIAGNOSIS — R00.2 PALPITATIONS: Primary | ICD-10-CM

## 2024-09-05 DIAGNOSIS — M05.711 RHEUMATOID ARTHRITIS INVOLVING RIGHT SHOULDER WITH POSITIVE RHEUMATOID FACTOR (HCC): ICD-10-CM

## 2024-09-05 PROCEDURE — 99214 OFFICE O/P EST MOD 30 MIN: CPT

## 2024-09-05 PROCEDURE — 3074F SYST BP LT 130 MM HG: CPT

## 2024-09-05 PROCEDURE — 3725F SCREEN DEPRESSION PERFORMED: CPT

## 2024-09-05 PROCEDURE — 93000 ELECTROCARDIOGRAM COMPLETE: CPT

## 2024-09-05 PROCEDURE — 3079F DIAST BP 80-89 MM HG: CPT

## 2024-09-05 RX ORDER — ALBUTEROL SULFATE 90 UG/1
2 AEROSOL, METERED RESPIRATORY (INHALATION) EVERY 4 HOURS PRN
Qty: 8 G | Refills: 5 | Status: SHIPPED | OUTPATIENT
Start: 2024-09-05

## 2024-09-05 NOTE — PROGRESS NOTES
"Ambulatory Visit  Name: Martina Castro      : 1965      MRN: [id number]  Encounter Provider: MARVIN Rodríguez  Encounter Date: 2024   Encounter department: [facility]    Assessment & Plan   1. Palpitations  Assessment & Plan:  Reports that she was shopping last Friday. States that it felt like her heart skipped a beat and then \"it sped up and felt like I was going to drop and fall.\" Took BP when she got home SBPs 140's. Later in day BPs came down to 120's. EKG--- NSR. Zio patch ordered.   Orders:  -     POCT ECG  -     AMB extended holter monitor; Future; Expected date: 2024  2. Ovarian failure due to menopause  -     DXA bone density spine hip and pelvis; Future  3. Rheumatoid arthritis involving right shoulder with positive rheumatoid factor (HCC)  Assessment & Plan:  Has appt with rheum in Oct.       Depression Screening and Follow-up Plan: Patient was screened for depression during today's encounter. They screened negative with a PHQ-2 score of 0.      History of Present Illness     Dizziness  This is a new problem. The current episode started in the past 7 days. The problem occurs intermittently. Associated symptoms include weakness. Pertinent negatives include no abdominal pain, anorexia, arthralgias, change in bowel habit, chest pain, chills, congestion, coughing, diaphoresis, fatigue, fever, headaches, joint swelling, myalgias, nausea, neck pain, numbness, rash, sore throat, swollen glands, urinary symptoms, vertigo, visual change or vomiting. Nothing aggravates the symptoms. She has tried rest for the symptoms. The treatment provided mild relief.       Review of Systems   Constitutional:  Negative for activity change, chills, diaphoresis, fatigue and fever.   HENT:  Negative for congestion, ear pain, rhinorrhea and sore throat.    Eyes:  Negative for pain.   Respiratory:  Negative for cough, shortness of breath and wheezing.    Cardiovascular:  " "Negative for chest pain and leg swelling.   Gastrointestinal:  Negative for abdominal pain, anorexia, change in bowel habit, diarrhea, nausea and vomiting.   Musculoskeletal:  Negative for arthralgias, joint swelling, myalgias and neck pain.   Skin:  Negative for rash.   Neurological:  Positive for dizziness and weakness. Negative for vertigo, numbness and headaches.   All other systems reviewed and are negative.    Medical History Reviewed by provider this encounter:  Tobacco  Allergies  Meds  Problems  Med Hx  Surg Hx  Fam Hx       Current Outpatient Medications on File Prior to Visit   Medication Sig Dispense Refill    albuterol (Proventil HFA) 90 mcg/act inhaler Inhale 2 puffs every 4 (four) hours as needed for wheezing 8 g 5    [DISCONTINUED] predniSONE 20 mg tablet TAKE 1 TABLET BID X 5 DAYS THEN TAKE 1 TABLET QD FOR 5 DAYS 15 tablet 0     No current facility-administered medications on file prior to visit.      Objective     /84   Pulse 84   Ht 5' 4.5\" (1.638 m)   Wt 88.9 kg (196 lb)   SpO2 97%   BMI 33.12 kg/m²     Physical Exam  Vitals and nursing note reviewed.   Constitutional:       General: She is not in acute distress.     Appearance: Normal appearance. She is well-developed. She is not ill-appearing.   HENT:      Head: Normocephalic and atraumatic.      Right Ear: External ear normal.      Left Ear: External ear normal.   Cardiovascular:      Rate and Rhythm: Normal rate and regular rhythm.      Heart sounds: Normal heart sounds. No murmur heard.  Pulmonary:      Effort: Pulmonary effort is normal. No respiratory distress.      Breath sounds: Normal breath sounds. No wheezing.   Abdominal:      General: Bowel sounds are normal. There is no distension.      Palpations: Abdomen is soft.      Tenderness: There is no abdominal tenderness.   Musculoskeletal:      Cervical back: Neck supple.   Skin:     General: Skin is warm and dry.      Capillary Refill: Capillary refill takes less than " 2 seconds.   Neurological:      Mental Status: She is alert and oriented to person, place, and time. Mental status is at baseline.      Motor: No weakness.   Psychiatric:         Mood and Affect: Mood normal.         Behavior: Behavior normal.         Thought Content: Thought content normal.         Judgment: Judgment normal.       Administrative Statements   I have spent a total time of 30 minutes in caring for this patient on the day of the visit/encounter including Diagnostic results, Risks and benefits of tx options, Instructions for management, Patient and family education, Importance of tx compliance, Risk factor reductions, Impressions, Documenting in the medical record, Reviewing / ordering tests, medicine, procedures  , and Obtaining or reviewing history  .

## 2024-09-05 NOTE — ASSESSMENT & PLAN NOTE
"Reports that she was shopping last Friday. States that it felt like her heart skipped a beat and then \"it sped up and felt like I was going to drop and fall.\" Took BP when she got home SBPs 140's. Later in day BPs came down to 120's. EKG--- NSR. Zio patch ordered.   "

## 2024-09-27 ENCOUNTER — TELEPHONE (OUTPATIENT)
Age: 59
End: 2024-09-27

## 2024-09-27 NOTE — TELEPHONE ENCOUNTER
Pt calling back with information to send the letter; fax # [phone] and email: [email or web address]. If office has any questions, pt mentioned they can give her a call.

## 2024-09-27 NOTE — TELEPHONE ENCOUNTER
Pt called and asked if Dr. Francisco can write a permanent excuse note for jury duty, she is scheduled to go in on 10/22. Pt has rheumatoid arthritis which she is not on any current medication for so she cannot sit/stand for long periods of time or write. Pt also has an appt w/ cardio a couple of days before and they are going to put a heart monitor on her for that same week of 10/22 so she's not sure if she'll even bypass the security check.     Please advise and see if Dr. Francisco is able to write the letter regarding the medical conditions listed above, pt will call back if she finds a fax # for the courthouse or if she will just pick it up in office.

## 2024-10-08 ENCOUNTER — TELEPHONE (OUTPATIENT)
Age: 59
End: 2024-10-08

## 2024-10-08 NOTE — TELEPHONE ENCOUNTER
Patient called to inquire when she was first diagnosed with Rheumatoid arthritis  she believes it was 2012 or 2013. Was wondering if Dr murdock  could tell her when that was . She's trying to apply to SSI disability and she'll need that information. Please advise and contact patient.

## 2024-10-14 ENCOUNTER — OFFICE VISIT (OUTPATIENT)
Dept: CARDIOLOGY CLINIC | Facility: CLINIC | Age: 59
End: 2024-10-14
Payer: COMMERCIAL

## 2024-10-14 VITALS
BODY MASS INDEX: 33.8 KG/M2 | HEART RATE: 82 BPM | OXYGEN SATURATION: 95 % | DIASTOLIC BLOOD PRESSURE: 86 MMHG | WEIGHT: 200 LBS | SYSTOLIC BLOOD PRESSURE: 142 MMHG

## 2024-10-14 DIAGNOSIS — Z13.1 ENCOUNTER FOR SCREENING FOR DIABETES MELLITUS: ICD-10-CM

## 2024-10-14 DIAGNOSIS — R00.2 PALPITATIONS: Primary | ICD-10-CM

## 2024-10-14 PROCEDURE — 99204 OFFICE O/P NEW MOD 45 MIN: CPT | Performed by: STUDENT IN AN ORGANIZED HEALTH CARE EDUCATION/TRAINING PROGRAM

## 2024-10-14 NOTE — PATIENT INSTRUCTIONS
1) Zio-patch 2 weeks, call insurance copy for out of pocket cost.   2) Echocardiogram  3) hemoglobin A1c   4) Follow up in 4 months   5) Look into smart watch with EKG capabilities

## 2024-10-14 NOTE — PROGRESS NOTES
[facility]  Office Consultation  Martina Castro 59 y.o. female MRN: [id number]        1. Palpitations  -     AMB event recorder; Future; Expected date: 10/14/2024  -     Echo complete w/ contrast if indicated; Future; Expected date: 10/14/2024  2. Encounter for screening for diabetes mellitus  -     Hemoglobin A1C; Future  -     Hemoglobin A1C      Assessment & Plan  Palpitations  Plan to obtain 2 week event monitor, echocardiogram, and hemoglobin A1c. Recent blood work reviewed; TSH, CBC, and lytes wnl.   Encounter for screening for diabetes mellitus  Hemoglobin A1c       Reason for Consult / Principal Problem: Palpitations     HPI: Martina Castro is a 59 y.o. year old female HTN, Asthma, RA     Episode roughly 6 weeks ago, palptiations with dizziness while grocery shopping she felt pre-syncopal but hard to qualify the dizziness more than that. Symptoms lasted 1-2 minutes. She did not lose consciousness. She did some deep breathing, which it helped her symptoms. She took her BP at home and is mid-140s (typically 125-135). Afterward she felt tired. No chest pain, no shortness of breath. No new medications, no symptoms further symptoms before or after. No recent illness or sick contacts. She felt back to normal later that. She has had dizziness spells before (when pregnant, which thought to be ASIM). She has ear issues which cause dizziness (Vertigo) but reports that these symptoms felt different.     Currently denies any fever, chills, fatigue, new visual changes, lightheadedness, syncope, chest pain, palpitations, shortness of breath at rest or with exertion, orthopnea, PND, nausea, vomiting, diarrhea, dark or bright red blood in stools, lower extremity swelling, leg claudication.     She was previously on BP medications but it was discontinued earlier this year.     Social: No tobacco products, no ETOH use     Family history of Afib in mother, aunts, and grandfather     ROS: Pertinent positives and  "negatives as described in History of Present Illness.     Review of Systems      Past Medical History:   Diagnosis Date    Arthritis     Asthma     Primary hypertension 2024    Rheumatoid arthritis (HCC)      Past Surgical History:   Procedure Laterality Date     SECTION       Social History     Substance and Sexual Activity   Alcohol Use Not Currently    Comment: socially     Social History     Substance and Sexual Activity   Drug Use No     Social History     Tobacco Use   Smoking Status Never   Smokeless Tobacco Never     Family History   Adopted: Yes   Problem Relation Age of Onset    Alzheimer's disease Mother     Atrial fibrillation Mother     Atrial fibrillation Maternal Grandfather     Atrial fibrillation Maternal Aunt        Allergies:  Allergies   Allergen Reactions    Cinchonine     Hydroxychloroquine     Iv Contrast [Iodinated Contrast Media] Throat Swelling    Moxifloxacin     Other      Seasonal allergies    Sulfa Antibiotics     Zithromax [Azithromycin]        Medications:     Current Outpatient Medications:     albuterol (Proventil HFA) 90 mcg/act inhaler, Inhale 2 puffs every 4 (four) hours as needed for wheezing, Disp: 8 g, Rfl: 5      Vitals:    10/14/24 1057   BP: 142/86   Pulse: 82   SpO2: 95%     Weight (last 2 days)       Date/Time Weight    10/14/24 1057 90.7 (200)          Physical Exam      Laboratory Studies:  2024:    K3.8  BUN 19  Creatinine 0.88  AST is 9.6  ALT 17  TSH 0.87  Lipid panel 2024:  Cholesterol 223    HDL 53    Hemoglobin A1c 5.5     Cardiac testing:     EKG reviewed personally:   2024: BRISEYDA Jordan MD    Portions of the record may have been created with voice recognition software.  Occasional wrong word or \"sound a like\" substitutions may have occurred due to the inherent limitations of voice recognition software.  Read the chart carefully and recognize, using context, where substitutions have occurred.   "

## 2024-10-14 NOTE — ASSESSMENT & PLAN NOTE
Plan to obtain 2 week event monitor, echocardiogram, and hemoglobin A1c. Recent blood work reviewed; TSH, CBC, and lytes wnl.

## 2024-10-15 ENCOUNTER — TELEPHONE (OUTPATIENT)
Age: 59
End: 2024-10-15

## 2024-10-15 NOTE — TELEPHONE ENCOUNTER
Message left advising patient monitor will be interpreted only once. Monitor was mailed out today. She can send back with no charge if unwilling to attempt.

## 2024-10-15 NOTE — TELEPHONE ENCOUNTER
Spoke with patient, seen in office yesterday. Discussed possible Zio monitor but was advised to check with her insurance to make sure it was covered.    She was told she would have a $35 copay when the monitor was applied and then a $35 copay each time the monitor was looked at/interpreted.    She is unsure from a money if it is worth it for her or easier to buy an Apple watch with EKG capabilities.    Please advise.    Martina can be reached at [phone]

## 2024-10-15 NOTE — TELEPHONE ENCOUNTER
Lindy from Rheumatology center had called in and was requesting if there was any office notes from any radiology EKG, that could be faxed over too them     Phone: [phone] Ext [phone]  Fax: [phone]      Please advise out to Lindy for any additional questions, or confirmation if anything has been faxed.

## 2024-10-20 LAB
EST. AVERAGE GLUCOSE BLD GHB EST-MCNC: 123 MG/DL
HBA1C MFR BLD: 5.9 % (ref 4.8–5.6)

## 2024-10-21 ENCOUNTER — TELEPHONE (OUTPATIENT)
Age: 59
End: 2024-10-21

## 2024-10-21 DIAGNOSIS — M05.711 RHEUMATOID ARTHRITIS INVOLVING RIGHT SHOULDER WITH POSITIVE RHEUMATOID FACTOR (HCC): Primary | ICD-10-CM

## 2024-10-21 RX ORDER — PREDNISONE 20 MG/1
TABLET ORAL
Qty: 15 TABLET | Refills: 0 | Status: SHIPPED | OUTPATIENT
Start: 2024-10-21

## 2024-10-21 NOTE — TELEPHONE ENCOUNTER
Patient was prescribed Prednisone over the weekend by her Rheumatologist. She was prescribed 5 mg. Patient stated that she believes China Burnett had prescribed her 10 mg in the past and that it worked much better for her. She is asking if this would be able to be sent to [facility] for her. Please advise

## 2024-10-21 NOTE — TELEPHONE ENCOUNTER
Pt called stating the Zio fell off while sleeping and was unable to re attach. Pt wore for approx 12 hours  Pt called [employer] and they are willing to mail back, but pt is reluctant and feels it will fall off again.   Pt interested in purchasing an apple watch, but asking for your thoughts.

## 2024-10-22 NOTE — TELEPHONE ENCOUNTER
Spoke with pt - she stated she is not willing to try watch as it is very uncomfortable for her and also she believes it will fall off once again , she stated she will be looking into the apple watch and will notify the office with any updates .

## 2024-10-25 ENCOUNTER — TELEPHONE (OUTPATIENT)
Dept: FAMILY MEDICINE CLINIC | Facility: CLINIC | Age: 59
End: 2024-10-25

## 2024-10-25 ENCOUNTER — CLINICAL SUPPORT (OUTPATIENT)
Dept: CARDIOLOGY CLINIC | Facility: CLINIC | Age: 59
End: 2024-10-25

## 2024-10-25 ENCOUNTER — TELEPHONE (OUTPATIENT)
Age: 59
End: 2024-10-25

## 2024-10-25 DIAGNOSIS — R00.2 PALPITATIONS: ICD-10-CM

## 2024-10-25 NOTE — TELEPHONE ENCOUNTER
Martina called upset the family physician called her with the Zio results and concerned she will be getting a bill when the device was defective and kept falling off.

## 2024-10-25 NOTE — TELEPHONE ENCOUNTER
Spoke with pt. About results. Before reviewing results pt. Made me aware that heart monitor was not stable and therefore would not give a proper reading. She requested to not be billed for monitor and does not believe results can be accurate. I advised her to call cardiology and explain her situation and get another test done with hopes that monitor will remain in place for accurate readings.    ----- Message from MARVIN Ayala sent at 10/25/2024  2:23 PM EDT -----  Please notify patient:    Preliminary results of cardiac monitoring:    There were no arrhythmias seen.     Patient had a min HR of 43 bpm, max HR of 107 bpm, and avg HR of 73 bpm. Predominant underlying rhythm was Sinus Rhythm (normal). Premature contractions were rare. No further work up necessary at this time if she is asymptomatic.

## 2024-10-29 ENCOUNTER — HOSPITAL ENCOUNTER (OUTPATIENT)
Dept: RADIOLOGY | Facility: HOSPITAL | Age: 59
Discharge: HOME | End: 2024-10-29
Payer: COMMERCIAL

## 2024-10-29 DIAGNOSIS — M05.9 JUVENILE SEROPOSITIVE ARTHRITIS (HCC): ICD-10-CM

## 2024-10-29 DIAGNOSIS — M54.2 CERVICALGIA: ICD-10-CM

## 2024-10-29 DIAGNOSIS — M25.561 RIGHT KNEE PAIN, UNSPECIFIED CHRONICITY: ICD-10-CM

## 2024-10-29 DIAGNOSIS — K62.89 CHRONIC IDIOPATHIC ANAL PAIN: ICD-10-CM

## 2024-10-29 DIAGNOSIS — M79.642 LEFT HAND PAIN: ICD-10-CM

## 2024-10-29 DIAGNOSIS — G89.29 CHRONIC IDIOPATHIC ANAL PAIN: ICD-10-CM

## 2024-10-29 DIAGNOSIS — M79.641 RIGHT HAND PAIN: ICD-10-CM

## 2024-10-29 PROCEDURE — 73130 X-RAY EXAM OF HAND: CPT

## 2024-10-29 PROCEDURE — 73562 X-RAY EXAM OF KNEE 3: CPT

## 2024-10-29 PROCEDURE — 73110 X-RAY EXAM OF WRIST: CPT

## 2024-10-29 PROCEDURE — 72050 X-RAY EXAM NECK SPINE 4/5VWS: CPT

## 2024-10-31 NOTE — PROGRESS NOTES
[facility]     Event Recorder Results     Duration: 1 day and 5 hours     Indication:Palpitations     Findings: avg HR of 73 bpm. Predominant underlying rhythm was Sinus Rhythm. Isolated SVEs were rare (<1.0%), SVE Couplets were rare (<1.0%), and SVE Triplets were rare (<1.0%). Isolated VEs were rare (<1.0%), VE Couplets were rare (<1.0%), and no VE Triplets were present     Predominant rhythm is sinus.     Minimum heart rate: 43  Maximum heart rate: 107  Average heart rate: 73      Symptoms:No symptoms noted      Arrhythmias:  No atrial fibrillation transmitted or recorded.  No sustained arrhythmias noted.  No pauses recorded.

## 2024-11-05 ENCOUNTER — HOSPITAL ENCOUNTER (OUTPATIENT)
Dept: NON INVASIVE DIAGNOSTICS | Facility: HOSPITAL | Age: 59
Discharge: HOME | End: 2024-11-05
Attending: STUDENT IN AN ORGANIZED HEALTH CARE EDUCATION/TRAINING PROGRAM
Payer: COMMERCIAL

## 2024-11-05 VITALS
HEART RATE: 78 BPM | DIASTOLIC BLOOD PRESSURE: 82 MMHG | BODY MASS INDEX: 34.15 KG/M2 | SYSTOLIC BLOOD PRESSURE: 128 MMHG | WEIGHT: 200 LBS | HEIGHT: 64 IN

## 2024-11-05 DIAGNOSIS — R00.2 PALPITATIONS: ICD-10-CM

## 2024-11-05 LAB
AORTIC ROOT: 2.7 CM
APICAL FOUR CHAMBER EJECTION FRACTION: 68 %
ASCENDING AORTA: 3.1 CM
BSA FOR ECHO PROCEDURE: 1.96 M2
DOP CALC LVOT AREA: 3.14 CM2
DOP CALC LVOT DIAMETER: 2 CM
E WAVE DECELERATION TIME: 270 MS
E/A RATIO: 0.75
FRACTIONAL SHORTENING: 37 (ref 28–44)
INTERVENTRICULAR SEPTUM IN DIASTOLE (PARASTERNAL SHORT AXIS VIEW): 1 CM
INTERVENTRICULAR SEPTUM: 1 CM (ref 0.6–1.1)
LAAS-AP2: 14.8 CM2
LAAS-AP4: 15.1 CM2
LEFT ATRIUM AREA SYSTOLE SINGLE PLANE A4C: 14.2 CM2
LEFT ATRIUM SIZE: 3.6 CM
LEFT ATRIUM VOLUME (MOD BIPLANE): 42 ML
LEFT ATRIUM VOLUME INDEX (MOD BIPLANE): 21.3 ML/M2
LEFT INTERNAL DIMENSION IN SYSTOLE: 3.1 CM (ref 2.1–4)
LEFT VENTRICULAR INTERNAL DIMENSION IN DIASTOLE: 4.9 CM (ref 3.5–6)
LEFT VENTRICULAR POSTERIOR WALL IN END DIASTOLE: 1 CM
LEFT VENTRICULAR STROKE VOLUME: 73 ML
LVSV (TEICH): 73 ML
MV E'TISSUE VEL-SEP: 6 CM/S
MV PEAK A VEL: 0.65 M/S
MV PEAK E VEL: 49 CM/S
MV STENOSIS PRESSURE HALF TIME: 78 MS
MV VALVE AREA P 1/2 METHOD: 2.82
RA PRESSURE ESTIMATED: 3 MMHG
RIGHT ATRIUM AREA SYSTOLE A4C: 10.5 CM2
RIGHT VENTRICLE ID DIMENSION: 3.1 CM
RV PSP: 24 MMHG
SL CV LEFT ATRIUM LENGTH A2C: 5 CM
SL CV LV EF: 65
SL CV PED ECHO LEFT VENTRICLE DIASTOLIC VOLUME (MOD BIPLANE) 2D: 111 ML
SL CV PED ECHO LEFT VENTRICLE SYSTOLIC VOLUME (MOD BIPLANE) 2D: 38 ML
TR MAX PG: 21 MMHG
TR PEAK VELOCITY: 2.3 M/S
TRICUSPID ANNULAR PLANE SYSTOLIC EXCURSION: 1.7 CM
TRICUSPID VALVE PEAK REGURGITATION VELOCITY: 2.27 M/S

## 2024-11-05 PROCEDURE — 93306 TTE W/DOPPLER COMPLETE: CPT

## 2024-11-05 PROCEDURE — 93306 TTE W/DOPPLER COMPLETE: CPT | Performed by: INTERNAL MEDICINE

## 2024-11-26 ENCOUNTER — TELEPHONE (OUTPATIENT)
Age: 59
End: 2024-11-26

## 2024-11-26 DIAGNOSIS — M05.711 RHEUMATOID ARTHRITIS INVOLVING RIGHT SHOULDER WITH POSITIVE RHEUMATOID FACTOR (HCC): ICD-10-CM

## 2024-11-26 RX ORDER — PREDNISONE 20 MG/1
TABLET ORAL
Qty: 15 TABLET | Refills: 0 | Status: SHIPPED | OUTPATIENT
Start: 2024-11-26

## 2024-11-26 NOTE — TELEPHONE ENCOUNTER
Pt called in regarding concern as she said that she has had arthritis in the past and the pain is coming back. Her knee is also swollen again. She's asking if possibly prednisone can be called in just to get her to next Wednesday when she's supposed to be seeing rheumatologist. She is still waiting for them to get back to her on xray results. She really does not want to have to miss work as she just started a new job two weeks ago. Please advise.

## 2024-12-27 ENCOUNTER — APPOINTMENT (OUTPATIENT)
Dept: RADIOLOGY | Facility: HOSPITAL | Age: 59
End: 2024-12-27
Payer: COMMERCIAL

## 2024-12-27 ENCOUNTER — NURSE TRIAGE (OUTPATIENT)
Age: 59
End: 2024-12-27

## 2024-12-27 ENCOUNTER — HOSPITAL ENCOUNTER (EMERGENCY)
Facility: HOSPITAL | Age: 59
Discharge: HOME | End: 2024-12-27
Attending: EMERGENCY MEDICINE
Payer: COMMERCIAL

## 2024-12-27 VITALS
SYSTOLIC BLOOD PRESSURE: 128 MMHG | RESPIRATION RATE: 18 BRPM | HEART RATE: 71 BPM | DIASTOLIC BLOOD PRESSURE: 60 MMHG | TEMPERATURE: 98.2 F | OXYGEN SATURATION: 99 %

## 2024-12-27 DIAGNOSIS — T78.40XA ACUTE ALLERGIC REACTION, INITIAL ENCOUNTER: Primary | ICD-10-CM

## 2024-12-27 DIAGNOSIS — M05.711 RHEUMATOID ARTHRITIS INVOLVING RIGHT SHOULDER WITH POSITIVE RHEUMATOID FACTOR (HCC): ICD-10-CM

## 2024-12-27 LAB
ATRIAL RATE: 75 BPM
P AXIS: 40 DEGREES
PR INTERVAL: 140 MS
QRS AXIS: 37 DEGREES
QRSD INTERVAL: 76 MS
QT INTERVAL: 422 MS
QTC INTERVAL: 472 MS
T WAVE AXIS: 44 DEGREES
VENTRICULAR RATE: 75 BPM

## 2024-12-27 PROCEDURE — 99284 EMERGENCY DEPT VISIT MOD MDM: CPT | Performed by: EMERGENCY MEDICINE

## 2024-12-27 PROCEDURE — 93010 ELECTROCARDIOGRAM REPORT: CPT | Performed by: INTERNAL MEDICINE

## 2024-12-27 PROCEDURE — 93005 ELECTROCARDIOGRAM TRACING: CPT

## 2024-12-27 PROCEDURE — 94640 AIRWAY INHALATION TREATMENT: CPT

## 2024-12-27 PROCEDURE — 99283 EMERGENCY DEPT VISIT LOW MDM: CPT

## 2024-12-27 RX ORDER — PREDNISONE 20 MG/1
60 TABLET ORAL ONCE
Status: COMPLETED | OUTPATIENT
Start: 2024-12-27 | End: 2024-12-27

## 2024-12-27 RX ORDER — IPRATROPIUM BROMIDE AND ALBUTEROL SULFATE 2.5; .5 MG/3ML; MG/3ML
3 SOLUTION RESPIRATORY (INHALATION) ONCE
Status: COMPLETED | OUTPATIENT
Start: 2024-12-27 | End: 2024-12-27

## 2024-12-27 RX ORDER — METHYLPREDNISOLONE 4 MG/1
4 TABLET ORAL SEE ADMIN INSTRUCTIONS
Qty: 21 TABLET | Refills: 0 | Status: SHIPPED | OUTPATIENT
Start: 2024-12-27

## 2024-12-27 RX ADMIN — PREDNISONE 60 MG: 20 TABLET ORAL at 16:26

## 2024-12-27 RX ADMIN — IPRATROPIUM BROMIDE AND ALBUTEROL SULFATE 3 ML: .5; 3 SOLUTION RESPIRATORY (INHALATION) at 16:28

## 2024-12-27 NOTE — TELEPHONE ENCOUNTER
"Patient called in to PCP as she is unable to reach provider that prescribes her Enbrel. Last dose 12/22/24 which was her 2nd dose. She started with a rash at injection site, abdomen 12/25/24. The rash has now spread to the other side of her abdomen as well. Raised, red, itchy rash. She was told to put hydrocortisone cream on area yesterday by ordering doctor. She has taken several doses of Benadryl. She just started with some wheezing. She is not SOB. She will go to ED now. Her  is with her.    Reason for Disposition   Wheezing, stridor, hoarseness, or difficulty breathing    Additional Information   Life-threatening allergic reaction (anaphylaxis) suspected    Answer Assessment - Initial Assessment Questions  1. APPEARANCE of RASH: \"Describe the rash.\"       Slight raised and pink  2. LOCATION: \"Where is the rash located?\"       Both sides of belly button   3. NUMBER: \"How many spots are there?\"       1 clump  4. SIZE: \"How big are the spots?\" (Inches, centimeters or compare to size of a coin)       All around belly button   5. ONSET: \"When did the rash start?\"       12/25/24  6. ITCHING: \"Does the rash itch?\" If Yes, ask: \"How bad is the itch?\"  (Scale 0-10; or none, mild, moderate, severe)      Very itchy   7. PAIN: \"Does the rash hurt?\" If Yes, ask: \"How bad is the pain?\"  (Scale 0-10; or none, mild, moderate, severe)      No but, it is warm  8. OTHER SYMPTOMS: \"Do you have any other symptoms?\" (e.g., fever)      wheezing  9. PREGNANCY: \"Is there any chance you are pregnant?\" \"When was your last menstrual period?\"          Protocols used: Rash or Redness - Localized-Adult-OH, Allergic Reactions - Guideline Selection-Adult-AH, Anaphylaxis-Adult-AH    "

## 2024-12-27 NOTE — ED PROVIDER NOTES
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                   Assessment & Plan       Medical Decision Making  Diff includes allergic rxn to abd injection medication, consider asthma exacerbation, less likely cellulitis    Risk  Prescription drug management.             Medications   ipratropium-albuterol (DUO-NEB) 0.5-2.5 mg/3 mL inhalation solution 3 mL (3 mL Nebulization Given 24)   predniSONE tablet 60 mg (60 mg Oral Given 24 1626)       ED Risk Strat Scores                                              History of Present Illness       Chief Complaint   Patient presents with    Allergic Reaction     Pt states \"I static embrel which is for rheumatoid arthritis. This is my second week. I did my last dose on . I started with this rash (abd) and it seems to be spreading and now I feel wheezy\" Wheezing started about an hour ago. Denies any current cp and some sob. Last benadryl 1300 Denies any difficulty with speech or swallowing.        Past Medical History:   Diagnosis Date    Arthritis     Asthma     Primary hypertension 2024    Rheumatoid arthritis (HCC)       Past Surgical History:   Procedure Laterality Date     SECTION        Family History   Adopted: Yes   Problem Relation Age of Onset    Alzheimer's disease Mother     Atrial fibrillation Mother     Atrial fibrillation Maternal Grandfather     Atrial fibrillation Maternal Aunt       Social History     Tobacco Use    " Smoking status: Never    Smokeless tobacco: Never   Vaping Use    Vaping status: Never Used   Substance Use Topics    Alcohol use: Not Currently     Comment: socially    Drug use: No      E-Cigarette/Vaping    E-Cigarette Use Never User       E-Cigarette/Vaping Substances      I have reviewed and agree with the history as documented.     Hx from patient, Sunday Embral injections left lower abdomen, rash Wednesday, itchy, benadryl not providing relief.  Today rash spreading to right abdomen and wheezing.  No trouble swallowing, no fever        Review of Systems   Constitutional:  Negative for chills and fever.   HENT:  Negative for rhinorrhea and sore throat.    Respiratory:  Positive for shortness of breath.    Cardiovascular:  Negative for chest pain.   Gastrointestinal:  Negative for constipation, diarrhea, nausea and vomiting.   Genitourinary:  Negative for dysuria and frequency.   Skin:  Positive for rash.   All other systems reviewed and are negative.          Objective       ED Triage Vitals [12/27/24 1334]   Temperature Pulse Blood Pressure Respirations SpO2 Patient Position - Orthostatic VS   98.2 °F (36.8 °C) 88 155/94 20 98 % Sitting      Temp Source Heart Rate Source BP Location FiO2 (%) Pain Score    Temporal Monitor Right arm -- No Pain      Vitals      Date and Time Temp Pulse SpO2 Resp BP Pain Score FACES Pain Rating User   12/27/24 1730 -- 71 99 % 18 128/60 -- --    12/27/24 1615 -- 63 98 % 19 138/71 -- --    12/27/24 1334 98.2 °F (36.8 °C) 88 98 % 20 155/94 No Pain --             Physical Exam  Vitals and nursing note reviewed.   Constitutional:       Appearance: She is well-developed.   HENT:      Head: Normocephalic and atraumatic.      Right Ear: External ear normal.      Left Ear: External ear normal.      Nose: Nose normal.   Eyes:      Conjunctiva/sclera: Conjunctivae normal.      Pupils: Pupils are equal, round, and reactive to light.   Cardiovascular:      Rate and Rhythm: Normal rate  and regular rhythm.      Heart sounds: Normal heart sounds.   Pulmonary:      Effort: Pulmonary effort is normal. No respiratory distress.      Breath sounds: Wheezing present.   Abdominal:      General: Bowel sounds are normal. There is no distension.      Palpations: Abdomen is soft.      Tenderness: There is no abdominal tenderness.   Musculoskeletal:         General: No deformity. Normal range of motion.      Cervical back: Normal range of motion and neck supple. No spinous process tenderness.   Skin:     General: Skin is warm and dry.      Findings: Erythema and rash present.      Comments: See media   Neurological:      General: No focal deficit present.      Mental Status: She is alert.      GCS: GCS eye subscore is 4. GCS verbal subscore is 5. GCS motor subscore is 6.      Sensory: No sensory deficit.   Psychiatric:         Mood and Affect: Mood normal.         Results Reviewed       None            No orders to display       Procedures    ED Medication and Procedure Management   Prior to Admission Medications   Prescriptions Last Dose Informant Patient Reported? Taking?   albuterol (Proventil HFA) 90 mcg/act inhaler  Self No No   Sig: Inhale 2 puffs every 4 (four) hours as needed for wheezing   predniSONE 20 mg tablet   No No   Sig: TAKE 1 TABLET BID X 5 DAYS THEN TAKE 1 TABLET QD FOR 5 DAYS      Facility-Administered Medications: None     Discharge Medication List as of 12/27/2024  6:06 PM        START taking these medications    Details   methylprednisolone (MEDROL) 4 mg tablet Take 1 tablet (4 mg total) by mouth see administration instructions Medrol dose pack take as directed, Starting Fri 12/27/2024, Normal           CONTINUE these medications which have NOT CHANGED    Details   albuterol (Proventil HFA) 90 mcg/act inhaler Inhale 2 puffs every 4 (four) hours as needed for wheezing, Starting u 9/5/2024, Normal           STOP taking these medications       predniSONE 20 mg tablet Comments:   Reason for  Stopping:             No discharge procedures on file.  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

## 2024-12-30 ENCOUNTER — TELEPHONE (OUTPATIENT)
Dept: ADMINISTRATIVE | Facility: OTHER | Age: 59
End: 2024-12-30

## 2024-12-30 NOTE — TELEPHONE ENCOUNTER
12/30/24 2:15 PM    Patient contacted post ED visit, [facility] department spoke with patient/caregiver and outreach was successful.    Thank you.  Gisselle Jimenez MA  [facility]

## 2025-01-07 ENCOUNTER — TELEPHONE (OUTPATIENT)
Age: 60
End: 2025-01-07

## 2025-01-07 DIAGNOSIS — T78.2XXA ANAPHYLAXIS, INITIAL ENCOUNTER: Primary | ICD-10-CM

## 2025-01-07 NOTE — TELEPHONE ENCOUNTER
Patient is calling to request a prescription for an Epi pen, I do not see the medication on her list of meds and patient refuses to schedule an appointment to speak to PCP  Please review and advise

## 2025-01-09 RX ORDER — EPINEPHRINE 0.3 MG/.3ML
0.3 INJECTION SUBCUTANEOUS ONCE
Qty: 0.6 ML | Refills: 0 | Status: SHIPPED | OUTPATIENT
Start: 2025-01-09 | End: 2025-01-09

## 2025-01-09 NOTE — TELEPHONE ENCOUNTER
Patient states she had an allergic reaction to medications, she states she has an allergy to biologic medications.   Patient states her son has allergy to peanuts and would like it on hand incase.     Advised epipen was sent to [facility], advised if insurance doesn't cover she can pay out of pocket, patient states that she cannot afford to pay out of pocket, advised patient to reach out to pharmacy to see what they say. Patient in agreement to plan.

## 2025-01-29 ENCOUNTER — OFFICE VISIT (OUTPATIENT)
Dept: FAMILY MEDICINE CLINIC | Facility: CLINIC | Age: 60
End: 2025-01-29
Payer: COMMERCIAL

## 2025-01-29 VITALS
HEART RATE: 85 BPM | WEIGHT: 207 LBS | DIASTOLIC BLOOD PRESSURE: 82 MMHG | SYSTOLIC BLOOD PRESSURE: 121 MMHG | BODY MASS INDEX: 35.53 KG/M2

## 2025-01-29 DIAGNOSIS — Z00.00 ANNUAL PHYSICAL EXAM: ICD-10-CM

## 2025-01-29 DIAGNOSIS — M05.711 RHEUMATOID ARTHRITIS INVOLVING RIGHT SHOULDER WITH POSITIVE RHEUMATOID FACTOR (HCC): ICD-10-CM

## 2025-01-29 DIAGNOSIS — J45.20 MILD INTERMITTENT ASTHMA WITHOUT COMPLICATION: ICD-10-CM

## 2025-01-29 DIAGNOSIS — J01.00 ACUTE NON-RECURRENT MAXILLARY SINUSITIS: Primary | ICD-10-CM

## 2025-01-29 PROCEDURE — 99214 OFFICE O/P EST MOD 30 MIN: CPT | Performed by: NURSE PRACTITIONER

## 2025-01-29 PROCEDURE — 99396 PREV VISIT EST AGE 40-64: CPT | Performed by: NURSE PRACTITIONER

## 2025-01-29 RX ORDER — PREDNISONE 20 MG/1
TABLET ORAL
Qty: 15 TABLET | Refills: 0 | Status: SHIPPED | OUTPATIENT
Start: 2025-01-29

## 2025-01-29 NOTE — PROGRESS NOTES
Adult Annual Physical  Name: Martina Castro      : 1965      MRN: [id number]  Encounter Provider: MARVIN Sue  Encounter Date: 2025   Encounter department: [facility]    Assessment & Plan  Acute non-recurrent maxillary sinusitis    Orders:    amoxicillin-clavulanate (AUGMENTIN) 875-125 mg per tablet; Take 1 tablet by mouth every 12 (twelve) hours for 10 days    predniSONE 20 mg tablet; Take one tablet twice a day x 5 days, then take one tablet daily x 5 days.    Discussed viral vs bacterial infection  Take Zithromax as ordered and finish entire course.  Prednisone ordered for sinus pressure.  Continue OTC cough/cold medications as directed.  Call or return for problems/concerns.  Annual physical exam  Schedule mammogram which was ordered previously.       Mild intermittent asthma without complication  Condition stable. Denies shortness of breath. Continue inhaler as prescribed prn.         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 Had appointment yesterday and labs yesterday.         Immunizations and preventive care screenings were discussed with patient today. Appropriate education was printed on patient's after visit summary.    Counseling:  Alcohol/drug use: discussed moderation in alcohol intake, the recommendations for healthy alcohol use, and avoidance of illicit drug use.  Dental Health: discussed importance of regular tooth brushing, flossing, and dental visits.  Injury prevention: discussed safety/seat belts, safety helmets, smoke detectors, carbon monoxide detectors, and smoking near bedding or upholstery.  Sexual health: discussed sexually transmitted diseases, partner selection, use of condoms, avoidance of unintended pregnancy, and contraceptive alternatives.  Exercise: the importance of regular exercise/physical activity was discussed. Recommend exercise 3-5 times per week  for at least 30 minutes.          History of Present Illness     Adult Annual Physical:  Patient presents for annual physical. C/o sinus pain/pressure, congestion, cough started 1 week ago. Taking otc medications with mild relief.  Due annual physical [facility]..     Diet and Physical Activity:  - Diet/Nutrition: well balanced diet.  - Exercise: walking.    Depression Screening:  - PHQ-2 Score: 0  - PHQ-9 Score: 0    General Health:  - Sleep: sleeps well.  - Hearing: normal hearing bilateral ears.  - Vision: goes for regular eye exams.  - Dental: regular dental visits.    /GYN Health:  - Follows with GYN: no.   - Menopause: postmenopausal.     Advanced Care Planning:  - Has an advanced directive?: no    - Has a durable medical POA?: no    - ACP document given to patient?: yes      Review of Systems  Current Outpatient Medications on File Prior to Visit   Medication Sig Dispense Refill    albuterol (Proventil HFA) 90 mcg/act inhaler Inhale 2 puffs every 4 (four) hours as needed for wheezing 8 g 5    [DISCONTINUED] methylprednisolone (MEDROL) 4 mg tablet Take 1 tablet (4 mg total) by mouth see administration instructions Medrol dose pack take as directed 21 tablet 0    EPINEPHrine (EPIPEN) 0.3 mg/0.3 mL SOAJ Inject 0.3 mL (0.3 mg total) into a muscle once for 1 dose 0.6 mL 0     No current facility-administered medications on file prior to visit.        Objective   /82 (BP Location: Left arm, Patient Position: Sitting, Cuff Size: Standard)   Pulse 85   Wt 93.9 kg (207 lb)   BMI 35.53 kg/m²     Physical Exam  Vitals and nursing note reviewed.   Constitutional:       General: She is not in acute distress.     Appearance: Normal appearance. She is well-developed and normal weight. She is not ill-appearing, toxic-appearing or diaphoretic.   HENT:      Head: Normocephalic and atraumatic.      Right Ear: Tympanic membrane, ear canal and external ear  normal. There is no impacted cerumen.      Left Ear: Tympanic membrane, ear canal and external ear normal. There is no impacted cerumen.      Nose: Congestion and rhinorrhea present.      Right Sinus: Maxillary sinus tenderness present.      Left Sinus: Maxillary sinus tenderness present.      Mouth/Throat:      Mouth: Mucous membranes are moist.      Pharynx: Oropharynx is clear. Uvula midline. No oropharyngeal exudate or posterior oropharyngeal erythema.   Eyes:      General: No scleral icterus.        Right eye: No discharge.         Left eye: No discharge.      Extraocular Movements: Extraocular movements intact.      Conjunctiva/sclera: Conjunctivae normal.      Pupils: Pupils are equal, round, and reactive to light.   Neck:      Vascular: No carotid bruit.   Cardiovascular:      Rate and Rhythm: Normal rate and regular rhythm.      Pulses: Normal pulses.      Heart sounds: Normal heart sounds. No murmur heard.     No friction rub. No gallop.   Pulmonary:      Effort: Pulmonary effort is normal. No respiratory distress.      Breath sounds: Normal breath sounds. No stridor. No wheezing, rhonchi or rales.   Chest:      Chest wall: No tenderness.   Abdominal:      General: Abdomen is flat. Bowel sounds are normal. There is no distension.      Palpations: Abdomen is soft. There is no mass.      Tenderness: There is no abdominal tenderness. There is no right CVA tenderness, left CVA tenderness, guarding or rebound.      Hernia: No hernia is present.   Musculoskeletal:         General: No swelling, tenderness, deformity or signs of injury. Normal range of motion.      Cervical back: Normal range of motion and neck supple. No rigidity or tenderness. No muscular tenderness.      Right lower leg: No edema.      Left lower leg: No edema.   Lymphadenopathy:      Cervical: No cervical adenopathy.   Skin:     General: Skin is warm.      Coloration: Skin is not jaundiced or pale.      Findings: No bruising, erythema, lesion  or rash.   Neurological:      General: No focal deficit present.      Mental Status: She is alert and oriented to person, place, and time.      Cranial Nerves: No cranial nerve deficit.      Sensory: No sensory deficit.      Motor: No weakness.      Coordination: Coordination normal.      Gait: Gait normal.      Deep Tendon Reflexes: Reflexes normal.   Psychiatric:         Mood and Affect: Mood normal.         Behavior: Behavior normal.         Thought Content: Thought content normal.         Judgment: Judgment normal.

## 2025-01-29 NOTE — PATIENT INSTRUCTIONS
"Discussed viral vs bacterial infection  Take Zithromax as ordered and finish entire course.  Prednisone ordered for sinus pressure.  Continue OTC cough/cold medications as directed.  Call or return for problems/concerns.  Schedule mammogram which was ordered previously.    Patient Education     Routine physical for adults   The Basics   Written by the doctors and editors at [facility]   What is a physical? -- A physical is a routine visit, or \"check-up,\" with your doctor. You might also hear it called a \"wellness visit\" or \"preventive visit.\"  During each visit, the doctor will:   Ask about your physical and mental health   Ask about your habits, behaviors, and lifestyle   Do an exam   Give you vaccines if needed   Talk to you about any medicines you take   Give advice about your health   Answer your questions  Getting regular check-ups is an important part of taking care of your health. It can help your doctor find and treat any problems you have. But it's also important for preventing health problems.  A routine physical is different from a \"sick visit.\" A sick visit is when you see a doctor because of a health concern or problem. Since physicals are scheduled ahead of time, you can think about what you want to ask the doctor.  How often should I get a physical? -- It depends on your age and health. In general, for people age 21 years and older:   If you are younger than 50 years, you might be able to get a physical every 3 years.   If you are 50 years or older, your doctor might recommend a physical every year.  If you have an ongoing health condition, like diabetes or high blood pressure, your doctor will probably want to see you more often.  What happens during a physical? -- In general, each visit will include:   Physical exam - The doctor or nurse will check your height, weight, heart rate, and blood pressure. They will also look at your eyes and ears. They will ask about how you are feeling and whether you " "have any symptoms that bother you.   Medicines - It's a good idea to bring a list of all the medicines you take to each doctor visit. Your doctor will talk to you about your medicines and answer any questions. Tell them if you are having any side effects that bother you. You should also tell them if you are having trouble paying for any of your medicines.   Habits and behaviors - This includes:   Your diet   Your exercise habits   Whether you smoke, drink alcohol, or use drugs   Whether you are sexually active   Whether you feel safe at home  Your doctor will talk to you about things you can do to improve your health and lower your risk of health problems. They will also offer help and support. For example, if you want to quit smoking, they can give you advice and might prescribe medicines. If you want to improve your diet or get more physical activity, they can help you with this, too.   Lab tests, if needed - The tests you get will depend on your age and situation. For example, your doctor might want to check your:   Cholesterol   Blood sugar   Iron level   Vaccines - The recommended vaccines will depend on your age, health, and what vaccines you already had. Vaccines are very important because they can prevent certain serious or deadly infections.   Discussion of screening - \"Screening\" means checking for diseases or other health problems before they cause symptoms. Your doctor can recommend screening based on your age, risk, and preferences. This might include tests to check for:   Cancer, such as breast, prostate, cervical, ovarian, colorectal, prostate, lung, or skin cancer   Sexually transmitted infections, such as chlamydia and gonorrhea   Mental health conditions like depression and anxiety  Your doctor will talk to you about the different types of screening tests. They can help you decide which screenings to have. They can also explain what the results might mean.   Answering questions - The physical is a " good time to ask the doctor or nurse questions about your health. If needed, they can refer you to other doctors or specialists, too.  Adults older than 65 years often need other care, too. As you get older, your doctor will talk to you about:   How to prevent falling at home   Hearing or vision tests   Memory testing   How to take your medicines safely   Making sure that you have the help and support you need at home  All topics are updated as new evidence becomes available and our peer review process is complete.  This topic retrieved from [employer] on: May 02, 2024.  Topic 477153 Version 1.0  Release: 32.4.3 - C32.122  © 2024 UpToDate, Inc. and/or its affiliates. All rights reserved.  Consumer Information Use and Disclaimer   Disclaimer: This generalized information is a limited summary of diagnosis, treatment, and/or medication information. It is not meant to be comprehensive and should be used as a tool to help the user understand and/or assess potential diagnostic and treatment options. It does NOT include all information about conditions, treatments, medications, side effects, or risks that may apply to a specific patient. It is not intended to be medical advice or a substitute for the medical advice, diagnosis, or treatment of a health care provider based on the health care provider's examination and assessment of a patient's specific and unique circumstances. Patients must speak with a health care provider for complete information about their health, medical questions, and treatment options, including any risks or benefits regarding use of medications. This information does not endorse any treatments or medications as safe, effective, or approved for treating a specific patient. Up[employer], Inc. and its affiliates disclaim any warranty or liability relating to this information or the use thereof.The use of this information is governed by the Terms of Use, available at  https://www.woltersAgricanuwer.com/en/know/clinical-effectiveness-terms. 2024© [employer] and its affiliates and/or licensors. All rights reserved.  Copyright   © 2024 [employer] and/or its affiliates. All rights reserved.

## 2025-02-17 ENCOUNTER — OFFICE VISIT (OUTPATIENT)
Dept: FAMILY MEDICINE CLINIC | Facility: CLINIC | Age: 60
End: 2025-02-17
Payer: COMMERCIAL

## 2025-02-17 ENCOUNTER — HOSPITAL ENCOUNTER (OUTPATIENT)
Dept: RADIOLOGY | Facility: HOSPITAL | Age: 60
Discharge: HOME | End: 2025-02-17
Payer: COMMERCIAL

## 2025-02-17 VITALS
BODY MASS INDEX: 34.84 KG/M2 | HEART RATE: 91 BPM | WEIGHT: 203 LBS | TEMPERATURE: 98 F | OXYGEN SATURATION: 96 % | DIASTOLIC BLOOD PRESSURE: 78 MMHG | SYSTOLIC BLOOD PRESSURE: 122 MMHG

## 2025-02-17 DIAGNOSIS — B02.9 HERPES ZOSTER WITHOUT COMPLICATIONS: ICD-10-CM

## 2025-02-17 DIAGNOSIS — J40 BRONCHITIS: ICD-10-CM

## 2025-02-17 DIAGNOSIS — J40 BRONCHITIS: Primary | ICD-10-CM

## 2025-02-17 PROCEDURE — 99213 OFFICE O/P EST LOW 20 MIN: CPT

## 2025-02-17 PROCEDURE — 71046 X-RAY EXAM CHEST 2 VIEWS: CPT

## 2025-02-17 RX ORDER — FLUTICASONE PROPIONATE AND SALMETEROL 250; 50 UG/1; UG/1
1 POWDER RESPIRATORY (INHALATION) 2 TIMES DAILY
Qty: 60 BLISTER | Refills: 5 | Status: SHIPPED | OUTPATIENT
Start: 2025-02-17 | End: 2025-08-16

## 2025-02-17 RX ORDER — BENZONATATE 200 MG/1
200 CAPSULE ORAL 3 TIMES DAILY PRN
Qty: 20 CAPSULE | Refills: 0 | Status: SHIPPED | OUTPATIENT
Start: 2025-02-17

## 2025-02-17 RX ORDER — VALACYCLOVIR HYDROCHLORIDE 1 G/1
1000 TABLET, FILM COATED ORAL 3 TIMES DAILY
Qty: 21 TABLET | Refills: 0 | Status: SHIPPED | OUTPATIENT
Start: 2025-02-17 | End: 2025-02-24

## 2025-02-17 RX ORDER — DOXYCYCLINE HYCLATE 100 MG
100 TABLET ORAL 2 TIMES DAILY
Qty: 20 TABLET | Refills: 0 | Status: SHIPPED | OUTPATIENT
Start: 2025-02-17 | End: 2025-02-27

## 2025-02-17 RX ORDER — ALBUTEROL SULFATE 90 UG/1
2 INHALANT RESPIRATORY (INHALATION) EVERY 4 HOURS PRN
Qty: 8 G | Refills: 5 | Status: SHIPPED | OUTPATIENT
Start: 2025-02-17

## 2025-02-17 NOTE — PROGRESS NOTES
Name: Martina Castro      : 1965      MRN: [id number]  Encounter Provider: MARVIN Rodríguez  Encounter Date: 2025   Encounter department: [facility]  :  Assessment & Plan  Bronchitis    Orders:    XR chest pa and lateral; Future    doxycycline hyclate (VIBRA-TABS) 100 mg tablet; Take 1 tablet (100 mg total) by mouth 2 (two) times a day for 10 days    albuterol (Proventil HFA) 90 mcg/act inhaler; Inhale 2 puffs every 4 (four) hours as needed for wheezing    Fluticasone-Salmeterol (Advair) 250-50 mcg/dose inhaler; Inhale 1 puff 2 (two) times a day Rinse mouth after use.    benzonatate (TESSALON) 200 MG capsule; Take 1 capsule (200 mg total) by mouth 3 (three) times a day as needed for cough    Herpes zoster without complications  SEE PICTURE IN MEDIA  Orders:    valACYclovir (VALTREX) 1,000 mg tablet; Take 1 tablet (1,000 mg total) by mouth 3 (three) times a day for 7 days          Depression Screening and Follow-up Plan: Patient was screened for depression during today's encounter. They screened negative with a PHQ-2 score of 0.        History of Present Illness   URI   This is a new problem. The current episode started 1 to 4 weeks ago. The problem has been unchanged. Maximum temperature: 101 highest. 2 days ago fever broke. Associated symptoms include congestion, coughing, headaches, a plugged ear sensation, rhinorrhea, vomiting (posttussive) and wheezing. Pertinent negatives include no abdominal pain, chest pain, diarrhea, dysuria, ear pain, joint pain, joint swelling, nausea, neck pain, rash, sinus pain, sneezing, sore throat or swollen glands. She has tried sleep and acetaminophen (mucinex, ny/dayquil, augmentin, prednisone) for the symptoms.     Review of Systems   Constitutional:  Positive for appetite change, fatigue and fever. Negative for activity change.   HENT:  Positive for congestion and rhinorrhea. Negative for ear pain, sinus pain, sneezing  and sore throat.    Eyes:  Negative for pain.   Respiratory:  Positive for cough and wheezing. Negative for shortness of breath.    Cardiovascular:  Negative for chest pain and leg swelling.   Gastrointestinal:  Positive for vomiting (posttussive). Negative for abdominal pain, diarrhea and nausea.   Genitourinary:  Negative for dysuria.   Musculoskeletal:  Negative for arthralgias, joint pain, myalgias and neck pain.   Skin:  Negative for rash.   Neurological:  Positive for headaches. Negative for dizziness, weakness and numbness.   All other systems reviewed and are negative.      Objective   /78 (BP Location: Left arm, Patient Position: Sitting, Cuff Size: Standard)   Pulse 91   Temp 98 °F (36.7 °C) (Tympanic)   Wt 92.1 kg (203 lb)   SpO2 96%   BMI 34.84 kg/m²      Physical Exam  Vitals and nursing note reviewed.   Constitutional:       General: She is not in acute distress.     Appearance: Normal appearance. She is well-developed. She is not ill-appearing.   HENT:      Head: Normocephalic and atraumatic.      Right Ear: Tympanic membrane, ear canal and external ear normal. There is no impacted cerumen.      Left Ear: Tympanic membrane, ear canal and external ear normal. There is no impacted cerumen.      Nose: Congestion present. No rhinorrhea.      Mouth/Throat:      Mouth: Mucous membranes are moist.      Pharynx: Posterior oropharyngeal erythema present.   Cardiovascular:      Rate and Rhythm: Normal rate and regular rhythm.      Heart sounds: Normal heart sounds. No murmur heard.  Pulmonary:      Effort: Pulmonary effort is normal. No respiratory distress.      Breath sounds: Wheezing present.   Abdominal:      General: Bowel sounds are normal. There is no distension.      Palpations: Abdomen is soft.      Tenderness: There is no abdominal tenderness.   Musculoskeletal:      Cervical back: Neck supple.   Skin:     General: Skin is warm and dry.      Capillary Refill: Capillary refill takes less  than 2 seconds.      Findings: Rash present.   Neurological:      Mental Status: She is alert and oriented to person, place, and time. Mental status is at baseline.   Psychiatric:         Mood and Affect: Mood normal.         Behavior: Behavior normal.       Administrative Statements   I have spent a total time of 30 minutes in caring for this patient on the day of the visit/encounter including Risks and benefits of tx options, Instructions for management, Patient and family education, Importance of tx compliance, Risk factor reductions, Impressions, Documenting in the medical record, Reviewing/placing orders in the medical record (including tests, medications, and/or procedures), and Obtaining or reviewing history  .

## 2025-02-17 NOTE — PATIENT INSTRUCTIONS
Discussed viral vs bacterial etiology.  Take entire course of doxycycline.  Tessalon perles as needed for cough.  Albuterol inhaler as needed for SOB/wheezing.  Advair inhaler twice daily for SOB/wheezing.  Valtrex 3 times a day for shingles.  Get chest XR.   Return if symptoms fail to improve or worsen.

## 2025-02-19 ENCOUNTER — NURSE TRIAGE (OUTPATIENT)
Dept: OTHER | Facility: OTHER | Age: 60
End: 2025-02-19

## 2025-02-20 ENCOUNTER — RESULTS FOLLOW-UP (OUTPATIENT)
Dept: FAMILY MEDICINE CLINIC | Facility: CLINIC | Age: 60
End: 2025-02-20

## 2025-02-20 ENCOUNTER — TELEPHONE (OUTPATIENT)
Dept: FAMILY MEDICINE CLINIC | Facility: CLINIC | Age: 60
End: 2025-02-20

## 2025-02-20 DIAGNOSIS — R93.89 ABNORMAL CHEST X-RAY: Primary | ICD-10-CM

## 2025-02-20 NOTE — TELEPHONE ENCOUNTER
"Regarding: vomiting / pain in liver area  ----- Message from Carmella DELGADO sent at 2/19/2025  7:00 PM EST -----  \"I was put on a bunch of medications because I'm sick and I also have the shingles, I did just vomit and I'm having some pain in my liver area, I'm not sure if it's because of the medication or from my coughing\"    "

## 2025-02-20 NOTE — TELEPHONE ENCOUNTER
Left message for patient regarding CXR results.    FINDINGS:     Mild increase in lung markings. No pneumothorax or pleural effusion.     Normal cardiomediastinal silhouette.     Bones are unremarkable for age.     Normal upper abdomen.     IMPRESSION:     Mild increase in lung markings. While this could be due to bronchitis or bronchial wall thickening from asthma, recommend further evaluation with a high-resolution chest CT to exclude pulmonary fibrosis given history of rheumatoid arthritis.       Ordered CT and provided central scheduling phone number on voicemail. Encouraged to call back with any questions or concerns.

## 2025-02-27 ENCOUNTER — HOSPITAL ENCOUNTER (OUTPATIENT)
Dept: CT IMAGING | Facility: HOSPITAL | Age: 60
Discharge: HOME | End: 2025-02-27
Payer: COMMERCIAL

## 2025-02-27 DIAGNOSIS — R93.89 ABNORMAL CHEST X-RAY: ICD-10-CM

## 2025-02-27 PROCEDURE — 71250 CT THORAX DX C-: CPT

## 2025-03-03 ENCOUNTER — TELEPHONE (OUTPATIENT)
Age: 60
End: 2025-03-03

## 2025-03-03 DIAGNOSIS — R91.1 LUNG NODULE: ICD-10-CM

## 2025-03-03 DIAGNOSIS — J84.9 ILD (INTERSTITIAL LUNG DISEASE) (HCC): ICD-10-CM

## 2025-03-03 DIAGNOSIS — K86.2 PANCREATIC CYST: Primary | ICD-10-CM

## 2025-03-03 NOTE — TELEPHONE ENCOUNTER
Patient called requesting update on requesting scheduling skin tag removal. Spoke to office staff and was advised patient in to schedule office eval in order to determine if skin tag able to be removed in office or will be referred out. When explained to patient patient became upset as she in unsure reasoning why skin tag can't just be removed with having to evaluated first. Pt decided not to schedule. Pt requesting update on CT results. Advised patient message wad forwarded to PCP and once resulted will give a call back. Pt then became upset as to why results were released to her through [employer] and recommendations have not been made by PCP and asked to be reviewed by provider today as she will be working tomorrow and does not want to receive a call at work with results she may or may not like.      Please review and advise.  Thank you

## 2025-03-03 NOTE — TELEPHONE ENCOUNTER
Patient called to ask if her provider would be able to remove a skin tag on her stomach that's irritated. She also would like a phone call in regards to her chest x ray. Please advise back to patient to further assist.

## 2025-03-04 DIAGNOSIS — K86.2 PANCREATIC CYST: Primary | ICD-10-CM

## 2025-03-04 NOTE — TELEPHONE ENCOUNTER
Called patient, tried to give results. Patient was not understanding and wanted further explanation. Offered VV, patient refused, states she shouldn't have to pay again, advised I cannot guarantee a call from the provider without visit.     Please advise or give a more detailed explanation for patient.

## 2025-03-05 NOTE — TELEPHONE ENCOUNTER
Patient called in very irritated and frustrated that [place] has not reached out to her regarding her test results and getting clarification on them. Patient mentioned she has been waiting 3 days and hasn't heard anything. Did inform patient that [place] is not in today 3/5. Patient did not like that. Please see if a different provider would be able to review the results, reach out to patient hopefully by today, and clarify the results as she stated that feels she might die.    Please advise back out to patient as soon as possible.

## 2025-03-07 ENCOUNTER — TELEPHONE (OUTPATIENT)
Dept: FAMILY MEDICINE CLINIC | Facility: CLINIC | Age: 60
End: 2025-03-07

## 2025-03-07 NOTE — TELEPHONE ENCOUNTER
Spoke with patient to relay CTA results.  Patient expressed frustration with initial phone call to discuss results.  States that she spoke with another nurse and results were explained well.    Reviewed CTA impression--    IMPRESSION:        1. New lower lobe and peripheral interstitial lung disease, which based on current literature consensus, the CT lung findings represent CT features most consistent with non-IPF diagnosis. Findings may be related to patient's rheumatoid arthritis.   Pulmonology consultation is recommended.     2. 6 mm peribronchovascular opacity may be a nodule or a component of the interstitial disease. Follow-up noncontrast chest is recommended in 1 year.     3. 10 mm pancreatic cyst is minimally increased in size since 2021. Follow-up abdominal imaging is advised in 1 year to assess for change and for further characterization. Preferred imaging modality: abdomen MRI and MRCP with and without IV contrast, or   triple phase abdomen CT with IV contrast, or abdomen MRI and MRCP without IV contrast.    Encourage follow-up with pulmonology.  Referral has been placed.  Patient states that she plans to follow-up with pulmonology after doing a little research.  Requested that imaging results be faxed to her rheumatologist as this is likely related to her RA.  Will fax over CTA and x-ray results.    Patient verbalized understanding that repeat imaging was ordered for 1 year to assess stability of new lung nodule and previously seen pancreatic cysts that has minimally increased in size.    This NP apologized for patient's experience with initial imaging result discussion. Emotional support provided. Patient thanked NP for calling back and will move forward with plan.

## 2025-03-11 ENCOUNTER — TELEPHONE (OUTPATIENT)
Dept: FAMILY MEDICINE CLINIC | Facility: CLINIC | Age: 60
End: 2025-03-11

## 2025-03-19 NOTE — TELEPHONE ENCOUNTER
Pt called to request to have the following reports re-faxed to Rheum  Chest Xray- 2/17/25  Chest CT Scan-2/27/25    Dr Florez, [facility] [place]    Please fax again as rheum stated they did not receive. Fax #[phone]

## 2025-03-20 NOTE — TELEPHONE ENCOUNTER
Mario Alberto from [facility] called to request last ov notes to be faxed at fax# [phone].  Please advise

## 2025-05-14 ENCOUNTER — CONSULT (OUTPATIENT)
Dept: PULMONOLOGY | Facility: CLINIC | Age: 60
End: 2025-05-14
Payer: COMMERCIAL

## 2025-05-14 VITALS
HEART RATE: 94 BPM | DIASTOLIC BLOOD PRESSURE: 72 MMHG | WEIGHT: 201.4 LBS | BODY MASS INDEX: 34.57 KG/M2 | SYSTOLIC BLOOD PRESSURE: 126 MMHG | OXYGEN SATURATION: 98 % | TEMPERATURE: 96.8 F

## 2025-05-14 DIAGNOSIS — R91.8 PULMONARY NODULES: ICD-10-CM

## 2025-05-14 DIAGNOSIS — J84.9 ILD (INTERSTITIAL LUNG DISEASE) (HCC): Primary | ICD-10-CM

## 2025-05-14 DIAGNOSIS — J45.20 MILD INTERMITTENT ASTHMA WITHOUT COMPLICATION: ICD-10-CM

## 2025-05-14 PROCEDURE — 99214 OFFICE O/P EST MOD 30 MIN: CPT | Performed by: INTERNAL MEDICINE

## 2025-05-14 NOTE — ASSESSMENT & PLAN NOTE
There are subtle lower lobe changes suggestive of early interstitial lung disease.  These findings were not present on prior scan in 2021.  Fortunately, she has no significant symptoms related to the findings.  She will undergo pulmonary function testing to serve as a baseline.  If there is any abnormality, I would suggest updating her CT and PFTs in 6 months to monitor for interval change.  If PFTs are normal, annual CT scan is acceptable.  Orders:    Complete PFT with post Bronchodilator and Six Minute walk; Future

## 2025-05-14 NOTE — ASSESSMENT & PLAN NOTE
Patient has albuterol to use on an as needed basis.  Maintenance inhaler therapy is not indicated at this time.

## 2025-05-14 NOTE — PROGRESS NOTES
Consultation - Pulmonary Medicine   Name: Martina Castro      : 1965      MRN: [id number]  Encounter Provider: Jodie Edge DO  Encounter Date: 2025   Encounter department: [facility] PULMONARY [facility]    Requesting Provider:   Tiny Rodríguez  [address] [id number]     Reason for Consult: Pulmonary nodule  Assessment & Plan  ILD (interstitial lung disease) (HCC)  There are subtle lower lobe changes suggestive of early interstitial lung disease.  These findings were not present on prior scan in .  Fortunately, she has no significant symptoms related to the findings.  She will undergo pulmonary function testing to serve as a baseline.  If there is any abnormality, I would suggest updating her CT and PFTs in 6 months to monitor for interval change.  If PFTs are normal, annual CT scan is acceptable.  Orders:    Complete PFT with post Bronchodilator and Six Minute walk; Future    Pulmonary nodules  Patient is scheduled for repeat CT in 1 year, but may need to be done sooner as above       Mild intermittent asthma without complication  Patient has albuterol to use on an as needed basis.  Maintenance inhaler therapy is not indicated at this time.       I will call with results of PFTs to determine timing of follow-up    No follow-ups on file.  History of Present Illness     Martina Castro is a 59 y.o. female who presents for evaluation regarding abnormal chest CT findings.  Patient has history of asthma, largely driven by allergies but has never seen a pulmonologist in the past.  She has albuterol that she uses intermittently, usually in the spring and fall.  In January of this year, she developed bronchitis, requiring antibiotics and steroids at the end of January and then another course of antibiotics in February.  The symptoms have since resolved.  During evaluation of her bronchitis, she underwent CT of the chest which showed basilar  interstitial changes concerning for early ILD.  Also noted to have some subcentimeter pulmonary nodules.  She is here today for evaluation of these findings.    She currently has no significant shortness of breath.  She suffers from postnasal drip and has a cough in the mornings that she attributes to allergies.  No significant sputum production.  Denies wheezing.    Patient has a history of rheumatoid arthritis going back at least 10 years.  She had been on methotrexate years ago.  She was tried on an injection and suffered a reaction after the second injection and is now retained on written Sandrine.  Her rheumatologist is Dr. Marcin Florez in [place].  Patient has minimal joint complaints at this time.  No rashes.  She has no difficulty swallowing or history of aspiration.  She has no significant smoking history.    Review of Systems  Aside from what is mentioned in the HPI, ROS is otherwise negative    Medical History Reviewed by provider this encounter:     .    Historical Information   Past Medical History:   Diagnosis Date    Arthritis     Asthma     Primary hypertension 2024    Rheumatoid arthritis (HCC)      Past Surgical History:   Procedure Laterality Date     SECTION       Social History   Tobacco Use History[1] Smoked sporadically in college    Family History:   Family History   Adopted: Yes   Problem Relation Age of Onset    Alzheimer's disease Mother     Atrial fibrillation Mother     Atrial fibrillation Maternal Grandfather     Atrial fibrillation Maternal Aunt        Objective   There were no vitals taken for this visit.     Physical Exam  Vitals reviewed.   Constitutional:       General: She is not in acute distress.     Appearance: She is well-developed.     Eyes:      General: No scleral icterus.    Neck:      Vascular: No JVD.     Cardiovascular:      Rate and Rhythm: Normal rate and regular rhythm.   Pulmonary:      Breath sounds: No wheezing, rhonchi or rales.   Abdominal:       "Tenderness: There is no abdominal tenderness.     Musculoskeletal:         General: No swelling.     Skin:     General: Skin is warm and dry.     Neurological:      Mental Status: She is alert and oriented to person, place, and time.     Psychiatric:         Mood and Affect: Mood normal.         Diagnostic Data:    Radiology results:    CT of the chest from 2/27/2025 is personally reviewed.  There are lower lobe/peripheral interstitial changes without traction bronchiectasis or honeycombing.  There is a 6 mm left lower lobe nodular opacity and a 5 mm left lower lobe pulmonary nodule, previously measuring 4 mm.  There is a 4 mm right lower lobe nodule, previously 3 mm when compared with study in June 2021    PFT/spirometry results:  No results found for: \"FEV1\", \"FVC\", \"WNX4DUC\", \"TLC\", \"DLCO\"   None on file    Oximetry testing:  None on file    Other studies:  Echocardiogram 11/5/2024  EF 65%, grade 1 diastolic dysfunction, normal RV size and systolic function without evidence of pulmonary hypertension.  RVSP 24 mmHg    Jodie Edge DO           [1]   Social History  Tobacco Use   Smoking Status Never   Smokeless Tobacco Never     "

## 2025-05-19 ENCOUNTER — OFFICE VISIT (OUTPATIENT)
Dept: FAMILY MEDICINE CLINIC | Facility: CLINIC | Age: 60
End: 2025-05-19
Payer: COMMERCIAL

## 2025-05-19 VITALS
TEMPERATURE: 97.5 F | SYSTOLIC BLOOD PRESSURE: 133 MMHG | BODY MASS INDEX: 33.63 KG/M2 | HEART RATE: 71 BPM | DIASTOLIC BLOOD PRESSURE: 78 MMHG | WEIGHT: 197 LBS | OXYGEN SATURATION: 98 % | HEIGHT: 64 IN

## 2025-05-19 DIAGNOSIS — H66.001 NON-RECURRENT ACUTE SUPPURATIVE OTITIS MEDIA OF RIGHT EAR WITHOUT SPONTANEOUS RUPTURE OF TYMPANIC MEMBRANE: Primary | ICD-10-CM

## 2025-05-19 PROCEDURE — 99213 OFFICE O/P EST LOW 20 MIN: CPT | Performed by: NURSE PRACTITIONER

## 2025-05-19 RX ORDER — AMOXICILLIN 500 MG/1
500 CAPSULE ORAL EVERY 8 HOURS SCHEDULED
Qty: 21 CAPSULE | Refills: 0 | Status: SHIPPED | OUTPATIENT
Start: 2025-05-19 | End: 2025-05-26

## 2025-05-19 NOTE — PROGRESS NOTES
"Name: Martina Castro      : 1965      MRN: [id number]  Encounter Provider: MARVIN Sue  Encounter Date: 2025   Encounter department: [facility]  :  Assessment & Plan  Non-recurrent acute suppurative otitis media of right ear without spontaneous rupture of tympanic membrane    Orders:    amoxicillin (AMOXIL) 500 mg capsule; Take 1 capsule (500 mg total) by mouth every 8 (eight) hours for 7 days           History of Present Illness   C/o right earache, right sided face pain x 2 days. Took Tylenol with mild relief. No fever.      Review of Systems   Constitutional:  Negative for activity change, chills, fatigue and fever.   HENT:  Positive for ear pain and sinus pressure. Negative for congestion, ear discharge, postnasal drip, rhinorrhea and sore throat.    Eyes:  Negative for pain, discharge and redness.   Respiratory:  Negative for cough and wheezing.    Cardiovascular:  Negative for chest pain.   Gastrointestinal:  Negative for constipation, diarrhea, nausea and vomiting.   Musculoskeletal:  Negative for myalgias.   Skin:  Negative for rash.   Neurological:  Negative for dizziness and headaches.       Objective   /78 (BP Location: Left arm, Patient Position: Sitting, Cuff Size: Standard)   Pulse 71   Temp 97.5 °F (36.4 °C) (Tympanic)   Ht 5' 4\" (1.626 m)   Wt 89.4 kg (197 lb)   SpO2 98%   BMI 33.81 kg/m²      Physical Exam  Vitals and nursing note reviewed.   Constitutional:       General: She is not in acute distress.     Appearance: Normal appearance. She is not ill-appearing.   HENT:      Head: Normocephalic.      Right Ear: Swelling and tenderness present. No drainage. A middle ear effusion is present. Tympanic membrane is erythematous. Tympanic membrane is not perforated.      Left Ear: Tympanic membrane, ear canal and external ear normal. There is no impacted cerumen.     Cardiovascular:      Rate and Rhythm: Normal rate and regular rhythm. "      Heart sounds: Normal heart sounds.   Pulmonary:      Effort: Pulmonary effort is normal. No respiratory distress.      Breath sounds: Normal breath sounds. No wheezing.   Chest:      Chest wall: No tenderness.     Musculoskeletal:      Cervical back: Normal range of motion and neck supple.     Neurological:      Mental Status: She is alert and oriented to person, place, and time.     Psychiatric:         Mood and Affect: Mood normal.         Behavior: Behavior normal.

## 2025-05-19 NOTE — PATIENT INSTRUCTIONS
Discussed allergic vs viral vs bacterial infection.  Amoxicillin as directed.  Tylenol/Ibuprofen as directed for pain.  Call or return for problems/concerns.

## 2025-05-20 ENCOUNTER — HOSPITAL ENCOUNTER (OUTPATIENT)
Dept: PULMONOLOGY | Facility: HOSPITAL | Age: 60
Discharge: HOME | End: 2025-05-20
Attending: INTERNAL MEDICINE
Payer: COMMERCIAL

## 2025-05-20 DIAGNOSIS — J84.9 ILD (INTERSTITIAL LUNG DISEASE) (HCC): ICD-10-CM

## 2025-05-20 PROCEDURE — 94761 N-INVAS EAR/PLS OXIMETRY MLT: CPT

## 2025-05-20 PROCEDURE — 94760 N-INVAS EAR/PLS OXIMETRY 1: CPT

## 2025-05-20 PROCEDURE — 94726 PLETHYSMOGRAPHY LUNG VOLUMES: CPT

## 2025-05-20 PROCEDURE — 94729 DIFFUSING CAPACITY: CPT

## 2025-05-20 PROCEDURE — 94060 EVALUATION OF WHEEZING: CPT

## 2025-05-20 RX ORDER — ALBUTEROL SULFATE 0.83 MG/ML
2.5 SOLUTION RESPIRATORY (INHALATION) ONCE
Status: COMPLETED | OUTPATIENT
Start: 2025-05-20 | End: 2025-05-20

## 2025-05-20 RX ADMIN — ALBUTEROL SULFATE 2.5 MG: 2.5 SOLUTION RESPIRATORY (INHALATION) at 10:16

## 2025-05-27 ENCOUNTER — RESULTS FOLLOW-UP (OUTPATIENT)
Dept: PULMONOLOGY | Facility: CLINIC | Age: 60
End: 2025-05-27

## 2025-05-27 DIAGNOSIS — M05.711 RHEUMATOID ARTHRITIS INVOLVING RIGHT SHOULDER WITH POSITIVE RHEUMATOID FACTOR (HCC): Primary | ICD-10-CM

## 2025-05-27 DIAGNOSIS — J84.9 ILD (INTERSTITIAL LUNG DISEASE) (HCC): ICD-10-CM
